# Patient Record
Sex: FEMALE | Race: WHITE | NOT HISPANIC OR LATINO | Employment: FULL TIME | URBAN - METROPOLITAN AREA
[De-identification: names, ages, dates, MRNs, and addresses within clinical notes are randomized per-mention and may not be internally consistent; named-entity substitution may affect disease eponyms.]

---

## 2017-05-04 LAB
BILIRUB SERPL-MCNC: NEGATIVE MG/DL
BLOOD URINE, POC: NEGATIVE
CLARITY, POC UA: CLEAR
COLOR, POC UA: COLORLESS
GLUCOSE UR QL STRIP: NEGATIVE
INFLUENZA A ANTIGEN, POC: NEGATIVE
INFLUENZA A ANTIGEN, POC: POSITIVE
INFLUENZA B ANTIGEN, POC: NEGATIVE
INFLUENZA B ANTIGEN, POC: POSITIVE
KETONES UR QL STRIP: NEGATIVE
LEUKOCYTE EST, POC UA: NEGATIVE
NITRITE, POC UA: NEGATIVE
PH, POC UA: 5
POC BETA-HCG (QUAL): POSITIVE
PROTEIN, POC: NEGATIVE
RAPID GROUP A STREP (OHS): POSITIVE
SPECIFIC GRAVITY, POC UA: 1
UROBILINOGEN, POC UA: NORMAL

## 2017-05-10 ENCOUNTER — HISTORICAL (OUTPATIENT)
Dept: URGENT CARE | Facility: CLINIC | Age: 27
End: 2017-05-10

## 2017-05-10 LAB — TSH SERPL-ACNC: 1.11 MIU/ML (ref 0.36–3.74)

## 2018-02-09 LAB
INFLUENZA A ANTIGEN, POC: POSITIVE
INFLUENZA B ANTIGEN, POC: NEGATIVE

## 2018-07-12 LAB
INFLUENZA A ANTIGEN, POC: NEGATIVE
INFLUENZA B ANTIGEN, POC: NEGATIVE
RAPID GROUP A STREP (OHS): NEGATIVE

## 2018-07-18 LAB — RAPID GROUP A STREP (OHS): NEGATIVE

## 2019-02-07 LAB
INFLUENZA A ANTIGEN, POC: NEGATIVE
INFLUENZA B ANTIGEN, POC: NEGATIVE

## 2019-02-18 ENCOUNTER — HISTORICAL (OUTPATIENT)
Dept: URGENT CARE | Facility: CLINIC | Age: 29
End: 2019-02-18

## 2019-02-18 LAB
ABS NEUT (OLG): 1.71 X10(3)/MCL (ref 2.1–9.2)
EOSINOPHIL NFR BLD MANUAL: 1 % (ref 0–8)
ERYTHROCYTE [DISTWIDTH] IN BLOOD BY AUTOMATED COUNT: 12.4 % (ref 11.5–17)
HCT VFR BLD AUTO: 41 % (ref 37–47)
HGB BLD-MCNC: 13 GM/DL (ref 12–16)
LYMPHOCYTES NFR BLD MANUAL: 50 % (ref 13–40)
MCH RBC QN AUTO: 27.8 PG (ref 27–31)
MCHC RBC AUTO-ENTMCNC: 31.7 GM/DL (ref 33–36)
MCV RBC AUTO: 87.8 FL (ref 80–94)
MONOCYTES NFR BLD MANUAL: 3 % (ref 2–11)
NEUTROPHILS NFR BLD MANUAL: 45 % (ref 47–80)
PLATELET # BLD AUTO: 256 X10(3)/MCL (ref 130–400)
PLATELET # BLD EST: NORMAL 10*3/UL
PMV BLD AUTO: 10.3 FL (ref 7.4–10.4)
RBC # BLD AUTO: 4.67 X10(6)/MCL (ref 4.2–5.4)
WBC # SPEC AUTO: 3.9 X10(3)/MCL (ref 4.5–11.5)

## 2019-04-01 LAB
BILIRUB SERPL-MCNC: NEGATIVE MG/DL
BLOOD URINE, POC: NEGATIVE
CLARITY, POC UA: CLEAR
COLOR, POC UA: NORMAL
GLUCOSE UR QL STRIP: NEGATIVE
KETONES UR QL STRIP: NEGATIVE
LEUKOCYTE EST, POC UA: NEGATIVE
NITRITE, POC UA: NEGATIVE
PH, POC UA: 6
PROTEIN, POC: NEGATIVE
RAPID GROUP A STREP (OHS): NEGATIVE
SPECIFIC GRAVITY, POC UA: 1.02
UROBILINOGEN, POC UA: NORMAL

## 2019-04-05 ENCOUNTER — HISTORICAL (OUTPATIENT)
Dept: LAB | Facility: HOSPITAL | Age: 29
End: 2019-04-05

## 2019-04-05 LAB
ABS NEUT (OLG): 3.35 X10(3)/MCL (ref 2.1–9.2)
ALBUMIN SERPL-MCNC: 3.9 GM/DL (ref 3.4–5)
ALBUMIN/GLOB SERPL: 1.2 {RATIO}
ALP SERPL-CCNC: 39 UNIT/L (ref 38–126)
ALT SERPL-CCNC: 30 UNIT/L (ref 12–78)
ANISOCYTOSIS BLD QL SMEAR: 1
APPEARANCE, UA: CLEAR
AST SERPL-CCNC: 14 UNIT/L (ref 15–37)
BACTERIA SPEC CULT: NORMAL /HPF
BASOPHILS NFR BLD MANUAL: 1 % (ref 0–2)
BILIRUB SERPL-MCNC: 1 MG/DL (ref 0.2–1)
BILIRUB UR QL STRIP: NEGATIVE
BILIRUBIN DIRECT+TOT PNL SERPL-MCNC: 0.2 MG/DL (ref 0–0.2)
BILIRUBIN DIRECT+TOT PNL SERPL-MCNC: 0.8 MG/DL (ref 0–0.8)
BUN SERPL-MCNC: 9 MG/DL (ref 7–18)
CALCIUM SERPL-MCNC: 8.7 MG/DL (ref 8.5–10.1)
CHLORIDE SERPL-SCNC: 105 MMOL/L (ref 98–107)
CHOLEST SERPL-MCNC: 154 MG/DL (ref 0–200)
CHOLEST/HDLC SERPL: 2.3 {RATIO} (ref 0–4)
CO2 SERPL-SCNC: 25 MMOL/L (ref 21–32)
COLOR UR: YELLOW
CREAT SERPL-MCNC: 0.84 MG/DL (ref 0.55–1.02)
DEPRECATED CALCIDIOL+CALCIFEROL SERPL-MC: 15.03 NG/ML (ref 30–80)
EOSINOPHIL NFR BLD MANUAL: 1 % (ref 0–8)
ERYTHROCYTE [DISTWIDTH] IN BLOOD BY AUTOMATED COUNT: 12.2 % (ref 11.5–17)
GLOBULIN SER-MCNC: 3.3 GM/DL (ref 2.4–3.5)
GLUCOSE (UA): NEGATIVE
GLUCOSE SERPL-MCNC: 84 MG/DL (ref 74–106)
HCT VFR BLD AUTO: 40.4 % (ref 37–47)
HDLC SERPL-MCNC: 66 MG/DL (ref 35–60)
HGB BLD-MCNC: 12.9 GM/DL (ref 12–16)
HGB UR QL STRIP: NEGATIVE
KETONES UR QL STRIP: NEGATIVE
LDLC SERPL CALC-MCNC: 72 MG/DL (ref 0–129)
LEUKOCYTE ESTERASE UR QL STRIP: NEGATIVE
LYMPHOCYTES NFR BLD MANUAL: 32 % (ref 13–40)
MCH RBC QN AUTO: 28.2 PG (ref 27–31)
MCHC RBC AUTO-ENTMCNC: 31.9 GM/DL (ref 33–36)
MCV RBC AUTO: 88.4 FL (ref 80–94)
MICROCYTES BLD QL SMEAR: 1
MONOCYTES NFR BLD MANUAL: 8 % (ref 2–11)
NEUTROPHILS NFR BLD MANUAL: 56 % (ref 47–80)
NITRITE UR QL STRIP: NEGATIVE
PH UR STRIP: 6 [PH] (ref 5–9)
PLATELET # BLD AUTO: 299 X10(3)/MCL (ref 130–400)
PLATELET # BLD EST: NORMAL 10*3/UL
PMV BLD AUTO: 11 FL (ref 7.4–10.4)
POTASSIUM SERPL-SCNC: 4 MMOL/L (ref 3.5–5.1)
PROT SERPL-MCNC: 7.2 GM/DL (ref 6.4–8.2)
PROT UR QL STRIP: NEGATIVE
RBC # BLD AUTO: 4.57 X10(6)/MCL (ref 4.2–5.4)
RBC #/AREA URNS HPF: NORMAL /[HPF]
SODIUM SERPL-SCNC: 140 MMOL/L (ref 136–145)
SP GR UR STRIP: 1.01 (ref 1–1.03)
SQUAMOUS EPITHELIAL, UA: NORMAL
T4 FREE SERPL-MCNC: 1.29 NG/DL (ref 0.76–1.46)
TRIGL SERPL-MCNC: 80 MG/DL (ref 30–150)
TSH SERPL-ACNC: 1.67 MIU/L (ref 0.36–3.74)
UROBILINOGEN UR STRIP-ACNC: 0.2
VLDLC SERPL CALC-MCNC: 16 MG/DL
WBC # SPEC AUTO: 6.5 X10(3)/MCL (ref 4.5–11.5)
WBC #/AREA URNS HPF: NORMAL /[HPF]

## 2019-04-17 ENCOUNTER — HISTORICAL (OUTPATIENT)
Dept: ADMINISTRATIVE | Facility: HOSPITAL | Age: 29
End: 2019-04-17

## 2019-09-19 ENCOUNTER — HISTORICAL (OUTPATIENT)
Dept: RADIOLOGY | Facility: HOSPITAL | Age: 29
End: 2019-09-19

## 2020-05-07 ENCOUNTER — HISTORICAL (OUTPATIENT)
Dept: LAB | Facility: HOSPITAL | Age: 30
End: 2020-05-07

## 2020-07-22 ENCOUNTER — HISTORICAL (OUTPATIENT)
Dept: LAB | Facility: HOSPITAL | Age: 30
End: 2020-07-22

## 2020-07-22 LAB — SARS-COV-2 RNA RESP QL NAA+PROBE: DETECTED

## 2020-10-16 ENCOUNTER — HISTORICAL (OUTPATIENT)
Dept: ADMINISTRATIVE | Facility: HOSPITAL | Age: 30
End: 2020-10-16

## 2020-10-16 LAB
ABS NEUT (OLG): 3.1 X10(3)/MCL (ref 2.1–9.2)
ALBUMIN SERPL-MCNC: 4.2 GM/DL (ref 3.5–5)
ALBUMIN/GLOB SERPL: 1.6 RATIO (ref 1.1–2)
ALP SERPL-CCNC: 40 UNIT/L (ref 40–150)
ALT SERPL-CCNC: 8 UNIT/L (ref 0–55)
APPEARANCE, UA: CLEAR
AST SERPL-CCNC: 10 UNIT/L (ref 5–34)
BACTERIA SPEC CULT: NORMAL /HPF
BASOPHILS # BLD AUTO: 0 X10(3)/MCL (ref 0–0.2)
BASOPHILS NFR BLD AUTO: 1 %
BILIRUB SERPL-MCNC: 2.1 MG/DL
BILIRUB UR QL STRIP: NEGATIVE
BILIRUBIN DIRECT+TOT PNL SERPL-MCNC: 0.7 MG/DL (ref 0–0.5)
BILIRUBIN DIRECT+TOT PNL SERPL-MCNC: 1.4 MG/DL (ref 0–0.8)
BUN SERPL-MCNC: 9.3 MG/DL (ref 7–18.7)
CALCIUM SERPL-MCNC: 8.7 MG/DL (ref 8.4–10.2)
CHLORIDE SERPL-SCNC: 106 MMOL/L (ref 98–107)
CHOLEST SERPL-MCNC: 169 MG/DL
CHOLEST/HDLC SERPL: 2 {RATIO} (ref 0–5)
CO2 SERPL-SCNC: 24 MMOL/L (ref 22–29)
COLOR UR: YELLOW
CREAT SERPL-MCNC: 0.79 MG/DL (ref 0.55–1.02)
EOSINOPHIL # BLD AUTO: 0.1 X10(3)/MCL (ref 0–0.9)
EOSINOPHIL NFR BLD AUTO: 2 %
ERYTHROCYTE [DISTWIDTH] IN BLOOD BY AUTOMATED COUNT: 12.6 % (ref 11.5–17)
EST. AVERAGE GLUCOSE BLD GHB EST-MCNC: 85.3 MG/DL
GLOBULIN SER-MCNC: 2.7 GM/DL (ref 2.4–3.5)
GLUCOSE (UA): NEGATIVE
GLUCOSE SERPL-MCNC: 74 MG/DL (ref 74–100)
HBA1C MFR BLD: 4.6 %
HCT VFR BLD AUTO: 40.2 % (ref 37–47)
HCV AB SERPL QL IA: NONREACTIVE
HDLC SERPL-MCNC: 72 MG/DL (ref 35–60)
HGB BLD-MCNC: 12.9 GM/DL (ref 12–16)
HGB UR QL STRIP: NEGATIVE
IMM GRANULOCYTES # BLD AUTO: 0 10*3/UL
IMM GRANULOCYTES NFR BLD AUTO: 0 %
KETONES UR QL STRIP: NEGATIVE
LDLC SERPL CALC-MCNC: 85 MG/DL (ref 50–140)
LEUKOCYTE ESTERASE UR QL STRIP: NEGATIVE
LYMPHOCYTES # BLD AUTO: 1.2 X10(3)/MCL (ref 0.6–4.6)
LYMPHOCYTES NFR BLD AUTO: 25 %
MCH RBC QN AUTO: 28.9 PG (ref 27–31)
MCHC RBC AUTO-ENTMCNC: 32.1 GM/DL (ref 33–36)
MCV RBC AUTO: 90.1 FL (ref 80–94)
MONOCYTES # BLD AUTO: 0.4 X10(3)/MCL (ref 0.1–1.3)
MONOCYTES NFR BLD AUTO: 8 %
NEUTROPHILS # BLD AUTO: 3.1 X10(3)/MCL (ref 2.1–9.2)
NEUTROPHILS NFR BLD AUTO: 64 %
NITRITE UR QL STRIP: NEGATIVE
PH UR STRIP: 5.5 [PH] (ref 5–9)
PLATELET # BLD AUTO: 295 X10(3)/MCL (ref 130–400)
PMV BLD AUTO: 10.4 FL (ref 9.4–12.4)
POTASSIUM SERPL-SCNC: 4.1 MMOL/L (ref 3.5–5.1)
PROT SERPL-MCNC: 6.9 GM/DL (ref 6.4–8.3)
PROT UR QL STRIP: NEGATIVE
RBC # BLD AUTO: 4.46 X10(6)/MCL (ref 4.2–5.4)
RBC #/AREA URNS HPF: NORMAL /[HPF]
SODIUM SERPL-SCNC: 141 MMOL/L (ref 136–145)
SP GR UR STRIP: 1.02 (ref 1–1.03)
SQUAMOUS EPITHELIAL, UA: NORMAL
TRIGL SERPL-MCNC: 62 MG/DL (ref 37–140)
TSH SERPL-ACNC: 1.08 UIU/ML (ref 0.35–4.94)
UROBILINOGEN UR STRIP-ACNC: 0.2
VLDLC SERPL CALC-MCNC: 12 MG/DL
WBC # SPEC AUTO: 4.9 X10(3)/MCL (ref 4.5–11.5)
WBC #/AREA URNS HPF: NORMAL /[HPF]

## 2020-11-02 ENCOUNTER — HISTORICAL (OUTPATIENT)
Dept: RADIOLOGY | Facility: HOSPITAL | Age: 30
End: 2020-11-02

## 2021-02-01 ENCOUNTER — HISTORICAL (OUTPATIENT)
Dept: LAB | Facility: HOSPITAL | Age: 31
End: 2021-02-01

## 2021-02-01 LAB — SARS-COV-2 RNA RESP QL NAA+PROBE: NOT DETECTED

## 2021-11-01 ENCOUNTER — HISTORICAL (OUTPATIENT)
Dept: ADMINISTRATIVE | Facility: HOSPITAL | Age: 31
End: 2021-11-01

## 2021-11-01 LAB
ABS NEUT (OLG): 1.75 X10(3)/MCL (ref 2.1–9.2)
ALBUMIN SERPL-MCNC: 4.4 GM/DL (ref 3.5–5)
ALBUMIN/GLOB SERPL: 1.6 RATIO (ref 1.1–2)
ALP SERPL-CCNC: 43 UNIT/L (ref 40–150)
ALT SERPL-CCNC: 7 UNIT/L (ref 0–55)
APPEARANCE, UA: ABNORMAL
AST SERPL-CCNC: 13 UNIT/L (ref 5–34)
BACTERIA SPEC CULT: ABNORMAL /HPF
BASOPHILS # BLD AUTO: 0.1 X10(3)/MCL (ref 0–0.2)
BASOPHILS NFR BLD AUTO: 2 %
BILIRUB SERPL-MCNC: 1.4 MG/DL
BILIRUB UR QL STRIP: NEGATIVE
BILIRUBIN DIRECT+TOT PNL SERPL-MCNC: 0.5 MG/DL (ref 0–0.5)
BILIRUBIN DIRECT+TOT PNL SERPL-MCNC: 0.9 MG/DL (ref 0–0.8)
BUN SERPL-MCNC: 9.9 MG/DL (ref 7–18.7)
CALCIUM SERPL-MCNC: 9.9 MG/DL (ref 8.7–10.5)
CHLORIDE SERPL-SCNC: 105 MMOL/L (ref 98–107)
CHOLEST SERPL-MCNC: 164 MG/DL
CHOLEST/HDLC SERPL: 2 {RATIO} (ref 0–5)
CO2 SERPL-SCNC: 26 MMOL/L (ref 22–29)
COLOR UR: YELLOW
CREAT SERPL-MCNC: 0.82 MG/DL (ref 0.55–1.02)
DEPRECATED CALCIDIOL+CALCIFEROL SERPL-MC: 45.3 NG/ML (ref 30–80)
EOSINOPHIL # BLD AUTO: 0.1 X10(3)/MCL (ref 0–0.9)
EOSINOPHIL NFR BLD AUTO: 3 %
ERYTHROCYTE [DISTWIDTH] IN BLOOD BY AUTOMATED COUNT: 13 % (ref 11.5–17)
EST. AVERAGE GLUCOSE BLD GHB EST-MCNC: 96.8 MG/DL
GLOBULIN SER-MCNC: 2.7 GM/DL (ref 2.4–3.5)
GLUCOSE (UA): NEGATIVE
GLUCOSE SERPL-MCNC: 82 MG/DL (ref 74–100)
HBA1C MFR BLD: 5 %
HCT VFR BLD AUTO: 38.7 % (ref 37–47)
HDLC SERPL-MCNC: 74 MG/DL (ref 35–60)
HGB BLD-MCNC: 12 GM/DL (ref 12–16)
HGB UR QL STRIP: ABNORMAL
KETONES UR QL STRIP: ABNORMAL
LDLC SERPL CALC-MCNC: 81 MG/DL (ref 50–140)
LEUKOCYTE ESTERASE UR QL STRIP: ABNORMAL
LYMPHOCYTES # BLD AUTO: 1.4 X10(3)/MCL (ref 0.6–4.6)
LYMPHOCYTES NFR BLD AUTO: 38 %
MCH RBC QN AUTO: 28 PG (ref 27–31)
MCHC RBC AUTO-ENTMCNC: 31 GM/DL (ref 33–36)
MCV RBC AUTO: 90.2 FL (ref 80–94)
MONOCYTES # BLD AUTO: 0.3 X10(3)/MCL (ref 0.1–1.3)
MONOCYTES NFR BLD AUTO: 8 %
NEUTROPHILS # BLD AUTO: 1.75 X10(3)/MCL (ref 2.1–9.2)
NEUTROPHILS NFR BLD AUTO: 48 %
NITRITE UR QL STRIP: NEGATIVE
PH UR STRIP: 5.5 [PH] (ref 5–9)
PLATELET # BLD AUTO: 365 X10(3)/MCL (ref 130–400)
PMV BLD AUTO: 10.2 FL (ref 9.4–12.4)
POTASSIUM SERPL-SCNC: 4.2 MMOL/L (ref 3.5–5.1)
PROT SERPL-MCNC: 7.1 GM/DL (ref 6.4–8.3)
PROT UR QL STRIP: ABNORMAL
RBC # BLD AUTO: 4.29 X10(6)/MCL (ref 4.2–5.4)
RBC #/AREA URNS HPF: ABNORMAL /[HPF]
SODIUM SERPL-SCNC: 140 MMOL/L (ref 136–145)
SP GR UR STRIP: 1.02 (ref 1–1.03)
SQUAMOUS EPITHELIAL, UA: 18 /HPF (ref 0–4)
TRIGL SERPL-MCNC: 45 MG/DL (ref 37–140)
TSH SERPL-ACNC: 1.58 UIU/ML (ref 0.35–4.94)
UROBILINOGEN UR STRIP-ACNC: 0.2
VLDLC SERPL CALC-MCNC: 9 MG/DL
WBC # SPEC AUTO: 3.7 X10(3)/MCL (ref 4.5–11.5)
WBC #/AREA URNS HPF: 287 /HPF (ref 0–3)

## 2021-11-02 LAB — FINAL CULTURE: NORMAL

## 2022-04-07 ENCOUNTER — HISTORICAL (OUTPATIENT)
Dept: ADMINISTRATIVE | Facility: HOSPITAL | Age: 32
End: 2022-04-07

## 2022-04-24 VITALS
DIASTOLIC BLOOD PRESSURE: 60 MMHG | OXYGEN SATURATION: 98 % | WEIGHT: 118.5 LBS | HEIGHT: 63 IN | BODY MASS INDEX: 21 KG/M2 | SYSTOLIC BLOOD PRESSURE: 90 MMHG

## 2022-06-01 ENCOUNTER — HOSPITAL ENCOUNTER (OUTPATIENT)
Dept: RADIOLOGY | Facility: HOSPITAL | Age: 32
Discharge: HOME | End: 2022-06-01
Attending: NURSE PRACTITIONER
Payer: COMMERCIAL

## 2022-06-01 DIAGNOSIS — Z12.31 ENCOUNTER FOR SCREENING MAMMOGRAM FOR MALIGNANT NEOPLASM OF BREAST: ICD-10-CM

## 2022-06-01 PROCEDURE — 77067 SCR MAMMO BI INCL CAD: CPT | Mod: 26,,, | Performed by: RADIOLOGY

## 2022-06-01 PROCEDURE — 77063 MAMMO DIGITAL SCREENING BILAT WITH TOMO: ICD-10-PCS | Mod: 26,,, | Performed by: RADIOLOGY

## 2022-06-01 PROCEDURE — 77063 BREAST TOMOSYNTHESIS BI: CPT | Mod: 26,,, | Performed by: RADIOLOGY

## 2022-06-01 PROCEDURE — 77067 SCR MAMMO BI INCL CAD: CPT | Mod: TC

## 2022-06-01 PROCEDURE — 77067 MAMMO DIGITAL SCREENING BILAT WITH TOMO: ICD-10-PCS | Mod: 26,,, | Performed by: RADIOLOGY

## 2022-09-15 ENCOUNTER — HISTORICAL (OUTPATIENT)
Dept: ADMINISTRATIVE | Facility: HOSPITAL | Age: 32
End: 2022-09-15
Payer: COMMERCIAL

## 2022-09-21 ENCOUNTER — HISTORICAL (OUTPATIENT)
Dept: ADMINISTRATIVE | Facility: HOSPITAL | Age: 32
End: 2022-09-21
Payer: COMMERCIAL

## 2022-09-22 ENCOUNTER — HISTORICAL (OUTPATIENT)
Dept: ADMINISTRATIVE | Facility: HOSPITAL | Age: 32
End: 2022-09-22
Payer: COMMERCIAL

## 2022-10-12 ENCOUNTER — OFFICE VISIT (OUTPATIENT)
Dept: URGENT CARE | Facility: CLINIC | Age: 32
End: 2022-10-12
Payer: COMMERCIAL

## 2022-10-12 VITALS
BODY MASS INDEX: 21.71 KG/M2 | TEMPERATURE: 98 F | HEIGHT: 62 IN | HEART RATE: 69 BPM | SYSTOLIC BLOOD PRESSURE: 109 MMHG | OXYGEN SATURATION: 100 % | WEIGHT: 118 LBS | DIASTOLIC BLOOD PRESSURE: 75 MMHG

## 2022-10-12 DIAGNOSIS — R42 VERTIGO: Primary | ICD-10-CM

## 2022-10-12 DIAGNOSIS — H69.90 DYSFUNCTION OF EUSTACHIAN TUBE, UNSPECIFIED LATERALITY: ICD-10-CM

## 2022-10-12 DIAGNOSIS — H65.193 ACUTE MIDDLE EAR EFFUSION, BILATERAL: ICD-10-CM

## 2022-10-12 PROCEDURE — 99203 OFFICE O/P NEW LOW 30 MIN: CPT | Mod: 25,,, | Performed by: FAMILY MEDICINE

## 2022-10-12 PROCEDURE — 99203 PR OFFICE/OUTPT VISIT, NEW, LEVL III, 30-44 MIN: ICD-10-PCS | Mod: 25,,, | Performed by: FAMILY MEDICINE

## 2022-10-12 PROCEDURE — 96372 PR INJECTION,THERAP/PROPH/DIAG2ST, IM OR SUBCUT: ICD-10-PCS | Mod: ,,, | Performed by: FAMILY MEDICINE

## 2022-10-12 PROCEDURE — 1160F PR REVIEW ALL MEDS BY PRESCRIBER/CLIN PHARMACIST DOCUMENTED: ICD-10-PCS | Mod: CPTII,,, | Performed by: FAMILY MEDICINE

## 2022-10-12 PROCEDURE — 3008F PR BODY MASS INDEX (BMI) DOCUMENTED: ICD-10-PCS | Mod: CPTII,,, | Performed by: FAMILY MEDICINE

## 2022-10-12 PROCEDURE — 1159F MED LIST DOCD IN RCRD: CPT | Mod: CPTII,,, | Performed by: FAMILY MEDICINE

## 2022-10-12 PROCEDURE — 1159F PR MEDICATION LIST DOCUMENTED IN MEDICAL RECORD: ICD-10-PCS | Mod: CPTII,,, | Performed by: FAMILY MEDICINE

## 2022-10-12 PROCEDURE — 3074F SYST BP LT 130 MM HG: CPT | Mod: CPTII,,, | Performed by: FAMILY MEDICINE

## 2022-10-12 PROCEDURE — 3074F PR MOST RECENT SYSTOLIC BLOOD PRESSURE < 130 MM HG: ICD-10-PCS | Mod: CPTII,,, | Performed by: FAMILY MEDICINE

## 2022-10-12 PROCEDURE — 3078F PR MOST RECENT DIASTOLIC BLOOD PRESSURE < 80 MM HG: ICD-10-PCS | Mod: CPTII,,, | Performed by: FAMILY MEDICINE

## 2022-10-12 PROCEDURE — 3008F BODY MASS INDEX DOCD: CPT | Mod: CPTII,,, | Performed by: FAMILY MEDICINE

## 2022-10-12 PROCEDURE — 1160F RVW MEDS BY RX/DR IN RCRD: CPT | Mod: CPTII,,, | Performed by: FAMILY MEDICINE

## 2022-10-12 PROCEDURE — 3078F DIAST BP <80 MM HG: CPT | Mod: CPTII,,, | Performed by: FAMILY MEDICINE

## 2022-10-12 PROCEDURE — 96372 THER/PROPH/DIAG INJ SC/IM: CPT | Mod: ,,, | Performed by: FAMILY MEDICINE

## 2022-10-12 RX ORDER — COPPER 313.4 MG/1
INTRAUTERINE DEVICE INTRAUTERINE
COMMUNITY
Start: 2021-07-21 | End: 2023-11-06

## 2022-10-12 RX ORDER — DEXAMETHASONE SODIUM PHOSPHATE 100 MG/10ML
8 INJECTION INTRAMUSCULAR; INTRAVENOUS
Status: COMPLETED | OUTPATIENT
Start: 2022-10-12 | End: 2022-10-12

## 2022-10-12 RX ORDER — PREDNISONE 10 MG/1
30 TABLET ORAL DAILY
Qty: 15 TABLET | Refills: 0 | Status: SHIPPED | OUTPATIENT
Start: 2022-10-12 | End: 2022-10-17

## 2022-10-12 RX ADMIN — DEXAMETHASONE SODIUM PHOSPHATE 8 MG: 100 INJECTION INTRAMUSCULAR; INTRAVENOUS at 12:10

## 2022-10-12 NOTE — PATIENT INSTRUCTIONS
Steroids sent to pharmacy  Start the steroids in the morning  Flonase or Nasacort daily  Continue Zyrtec D daily  This combination should clear up the fluid in the ears if not notify us and we will refer you to ENT

## 2022-10-12 NOTE — PROGRESS NOTES
"Subjective:       Patient ID: Nikki Rodriguez is a 32 y.o. female.    Vitals:  height is 5' 2" (1.575 m) and weight is 53.5 kg (118 lb). Her temperature is 98.2 °F (36.8 °C). Her blood pressure is 109/75 and her pulse is 69. Her oxygen saturation is 100%.     Chief Complaint: Dizziness    32 y.o female presents to clinic with dizziness, nausea, and fluid in ears for 5 days.  Denies any fever.  Denies drainage of the ears.  History of vertigo.        Constitution: Negative.   HENT: Negative.     Cardiovascular: Negative.    Eyes: Negative.    Respiratory: Negative.     Gastrointestinal: Negative.    Genitourinary: Negative.    Musculoskeletal: Negative.    Skin: Negative.    Allergic/Immunologic: Negative.    Neurological:  Positive for dizziness.   Hematologic/Lymphatic: Negative.      Objective:      Physical Exam   Constitutional: She is oriented to person, place, and time.  Non-toxic appearance. She does not appear ill. No distress. normal  HENT:   Head: Normocephalic and atraumatic.   Ears:   Right Ear: impacted cerumen  Left Ear: impacted cerumen (small effusion bilateral TMs)  Mouth/Throat: No posterior oropharyngeal erythema (postnasal drip is present).   Eyes: Conjunctivae are normal.   Pulmonary/Chest: Effort normal.   Abdominal: Normal appearance.   Neurological: She is alert and oriented to person, place, and time.   Skin: Skin is not diaphoretic.   Psychiatric: Her behavior is normal. Mood, judgment and thought content normal.   Vitals reviewed.         Previous History      Review of patient's allergies indicates:   Allergen Reactions    Azithromycin Itching, Rash and Hives       Past Medical History:   Diagnosis Date    Anxiety     Bipolar disorder     Depression      Current Outpatient Medications   Medication Instructions    copper intrauterine device (PARAGARD T 380A) 380 square mm IUD ParaGard intrauterine device  Start Date: 7/21/21  Status: Ordered    predniSONE (DELTASONE) 30 mg, Oral, Daily " "    History reviewed. No pertinent surgical history.  Family History   Problem Relation Age of Onset    No Known Problems Mother     No Known Problems Father        Social History     Tobacco Use    Smoking status: Never    Smokeless tobacco: Never   Substance Use Topics    Alcohol use: Yes     Comment: one time a week    Drug use: Never        Physical Exam      Vital Signs Reviewed   /75   Pulse 69   Temp 98.2 °F (36.8 °C)   Ht 5' 2" (1.575 m)   Wt 53.5 kg (118 lb)   LMP 10/06/2022 (Approximate)   SpO2 100%   BMI 21.58 kg/m²        Procedures    Procedures     Labs     Results for orders placed or performed in visit on 09/22/22   POCT Influenza A/B Molecular   Result Value Ref Range    Inflenza A Ag Positive     Influenza B Ag Negative        Assessment:       1. Vertigo    2. Dysfunction of Eustachian tube, unspecified laterality    3. Acute middle ear effusion, bilateral          Plan:       Steroids sent to pharmacy  Start the steroids in the morning  Flonase or Nasacort daily  Continue Zyrtec D daily  This combination should clear up the fluid in the ears if not notify us and we will refer you to ENT  Vertigo    Dysfunction of Eustachian tube, unspecified laterality    Acute middle ear effusion, bilateral    Other orders  -     dexAMETHasone injection 8 mg  -     predniSONE (DELTASONE) 10 MG tablet; Take 3 tablets (30 mg total) by mouth once daily. for 5 days  Dispense: 15 tablet; Refill: 0                   "

## 2022-10-17 ENCOUNTER — TELEPHONE (OUTPATIENT)
Dept: URGENT CARE | Facility: CLINIC | Age: 32
End: 2022-10-17
Payer: COMMERCIAL

## 2022-10-17 DIAGNOSIS — R42 VERTIGO: Primary | ICD-10-CM

## 2022-11-02 ENCOUNTER — OFFICE VISIT (OUTPATIENT)
Dept: FAMILY MEDICINE | Facility: CLINIC | Age: 32
End: 2022-11-02
Payer: COMMERCIAL

## 2022-11-02 ENCOUNTER — TELEPHONE (OUTPATIENT)
Dept: FAMILY MEDICINE | Facility: CLINIC | Age: 32
End: 2022-11-02

## 2022-11-02 VITALS
HEIGHT: 62 IN | HEART RATE: 64 BPM | TEMPERATURE: 98 F | BODY MASS INDEX: 23.61 KG/M2 | OXYGEN SATURATION: 98 % | WEIGHT: 128.31 LBS | RESPIRATION RATE: 17 BRPM | SYSTOLIC BLOOD PRESSURE: 99 MMHG | DIASTOLIC BLOOD PRESSURE: 66 MMHG

## 2022-11-02 DIAGNOSIS — F31.9 BIPOLAR AFFECTIVE DISORDER, REMISSION STATUS UNSPECIFIED: Chronic | ICD-10-CM

## 2022-11-02 DIAGNOSIS — F90.1 ATTENTION DEFICIT HYPERACTIVITY DISORDER (ADHD), PREDOMINANTLY HYPERACTIVE TYPE: Chronic | ICD-10-CM

## 2022-11-02 DIAGNOSIS — Z13.31 POSITIVE DEPRESSION SCREENING: ICD-10-CM

## 2022-11-02 DIAGNOSIS — F41.9 ANXIETY: Chronic | ICD-10-CM

## 2022-11-02 DIAGNOSIS — Z00.00 WELLNESS EXAMINATION: Primary | ICD-10-CM

## 2022-11-02 DIAGNOSIS — F32.9 MAJOR DEPRESSIVE DISORDER, REMISSION STATUS UNSPECIFIED, UNSPECIFIED WHETHER RECURRENT: Chronic | ICD-10-CM

## 2022-11-02 PROBLEM — M41.9 SCOLIOSIS: Chronic | Status: ACTIVE | Noted: 2022-11-02

## 2022-11-02 PROBLEM — M41.9 SCOLIOSIS: Status: ACTIVE | Noted: 2022-11-02

## 2022-11-02 PROCEDURE — 3078F PR MOST RECENT DIASTOLIC BLOOD PRESSURE < 80 MM HG: ICD-10-PCS | Mod: CPTII,,, | Performed by: STUDENT IN AN ORGANIZED HEALTH CARE EDUCATION/TRAINING PROGRAM

## 2022-11-02 PROCEDURE — 1159F PR MEDICATION LIST DOCUMENTED IN MEDICAL RECORD: ICD-10-PCS | Mod: CPTII,,, | Performed by: STUDENT IN AN ORGANIZED HEALTH CARE EDUCATION/TRAINING PROGRAM

## 2022-11-02 PROCEDURE — 1160F RVW MEDS BY RX/DR IN RCRD: CPT | Mod: CPTII,,, | Performed by: STUDENT IN AN ORGANIZED HEALTH CARE EDUCATION/TRAINING PROGRAM

## 2022-11-02 PROCEDURE — 3074F PR MOST RECENT SYSTOLIC BLOOD PRESSURE < 130 MM HG: ICD-10-PCS | Mod: CPTII,,, | Performed by: STUDENT IN AN ORGANIZED HEALTH CARE EDUCATION/TRAINING PROGRAM

## 2022-11-02 PROCEDURE — 3008F BODY MASS INDEX DOCD: CPT | Mod: CPTII,,, | Performed by: STUDENT IN AN ORGANIZED HEALTH CARE EDUCATION/TRAINING PROGRAM

## 2022-11-02 PROCEDURE — 1160F PR REVIEW ALL MEDS BY PRESCRIBER/CLIN PHARMACIST DOCUMENTED: ICD-10-PCS | Mod: CPTII,,, | Performed by: STUDENT IN AN ORGANIZED HEALTH CARE EDUCATION/TRAINING PROGRAM

## 2022-11-02 PROCEDURE — 3078F DIAST BP <80 MM HG: CPT | Mod: CPTII,,, | Performed by: STUDENT IN AN ORGANIZED HEALTH CARE EDUCATION/TRAINING PROGRAM

## 2022-11-02 PROCEDURE — 1159F MED LIST DOCD IN RCRD: CPT | Mod: CPTII,,, | Performed by: STUDENT IN AN ORGANIZED HEALTH CARE EDUCATION/TRAINING PROGRAM

## 2022-11-02 PROCEDURE — 99395 PR PREVENTIVE VISIT,EST,18-39: ICD-10-PCS | Mod: ,,, | Performed by: STUDENT IN AN ORGANIZED HEALTH CARE EDUCATION/TRAINING PROGRAM

## 2022-11-02 PROCEDURE — 99395 PREV VISIT EST AGE 18-39: CPT | Mod: ,,, | Performed by: STUDENT IN AN ORGANIZED HEALTH CARE EDUCATION/TRAINING PROGRAM

## 2022-11-02 PROCEDURE — 3074F SYST BP LT 130 MM HG: CPT | Mod: CPTII,,, | Performed by: STUDENT IN AN ORGANIZED HEALTH CARE EDUCATION/TRAINING PROGRAM

## 2022-11-02 PROCEDURE — 3008F PR BODY MASS INDEX (BMI) DOCUMENTED: ICD-10-PCS | Mod: CPTII,,, | Performed by: STUDENT IN AN ORGANIZED HEALTH CARE EDUCATION/TRAINING PROGRAM

## 2022-11-02 NOTE — PROGRESS NOTES
Patient ID: [id number]     Chief Complaint: Annual Exam        HPI:      Disclaimer:  This note is prepared using voice recognition software and as such is likely to have errors despite attempts at proofreading. Please contact me for questions.    Nikki Rodriguez is a 32 y.o. female here today for an annual wellness visit. Reviewed and discussed lab results. She has not had bloodwork completed prior to visit .  Overall she feels well. No other complaints today.      LMP- Copper IUD. Regular  Chronic Issues-    Anxiety  Attention deficit hyperactivity disorder (ADHD), predominantly   hyperactive type  Bipolar disorder  Depression  Above well controlled with out meds     History reviewed. No pertinent surgical history.    Review of patient's allergies indicates:   Allergen Reactions    Azithromycin Itching, Rash and Hives       Outpatient Medications Marked as Taking for the 11/2/22 encounter (Office Visit) with Kasia Rodríguez MD   Medication Sig Dispense Refill    copper intrauterine device (PARAGARD T 380A) 380 square mm IUD ParaGard intrauterine device  Start Date: 7/21/21  Status: Ordered         Social History     Socioeconomic History    Marital status:    Tobacco Use    Smoking status: Never    Smokeless tobacco: Never   Substance and Sexual Activity    Alcohol use: Yes     Comment: one time a week    Drug use: Never    Sexual activity: Yes        Family History   Problem Relation Age of Onset    No Known Problems Mother     No Known Problems Father         Patient Care Team:  Kasia Rodríguez MD as PCP - General (Family Medicine)       Subjective:     ROS    See HPI for details    Constitutional: Denies Change in appetite. Denies Chills. Denies Fever. Denies Night sweats.  Eye: Denies Blurred vision. Denies Discharge. Denies Eye pain.  ENT: Denies Decreased hearing. Denies Sore throat. Denies Swollen glands.  Respiratory: Denies Cough. Denies Shortness of breath. Denies Shortness of breath with  "exertion. Denies Wheezing.  Cardiovascular: DeniesChest pain at rest. Denies Chest pain with exertion. Denies Irregular heartbeat. Denies Palpitations. Denies Edema.  Gastrointestinal: Denies Abdominal pain. DeniesDiarrhea. Denies Nausea. Denies Vomiting. Denies Hematemesis or Hematochezia.  Genitourinary: Denies Dysuria. Denies Urinary frequency. Denies Urinary urgency. Denies Blood in urine.  Endocrine: Denies Cold intolerance. Denies Excessive thirst. Denies Heat intolerance. Denies Weight loss. Denies Weight gain.  Musculoskeletal: Denies Painful joints. Denies Weakness.  Integumentary: Denies Rash. Denies Itching. Denies Dry skin.  Neurologic: Denies Dizziness. Denies Fainting. Denies Headache.  Psychiatric: Denies Depression. Denies Anxiety. Denies Suicidal/Homicidal ideations.    All Other ROS: Negative except as stated in HPI.       Objective:     BP 99/66 (BP Location: Left arm, Patient Position: Sitting, BP Method: Large (Manual))   Pulse 64   Temp 97.6 °F (36.4 °C) (Oral)   Resp 17   Ht 5' 2" (1.575 m)   Wt 58.2 kg (128 lb 4.8 oz)   LMP 10/05/2022 (Approximate)   SpO2 98%   BMI 23.47 kg/m²     Physical Exam    General: Alert and oriented, No acute distress.  Head: Normocephalic, Atraumatic.  Eye: Pupils are equal, round and reactive to light, Extraocular movements are intact, Sclera non-icteric.  Ears/Nose/Throat: Normal, Mucosa moist,Clear.  Neck/Thyroid: Supple, Non-tender, No carotid bruit, No palpable thyromegaly or thyroid nodule, No lymphadenopathy, No JVD, Full range of motion.  Respiratory: Clear to auscultation bilaterally; No wheezes, rales or rhonchi, Non-labored respirations, Symmetrical chest wall expansion.  Cardiovascular: Regular rate and rhythm, S1/S2 normal, No murmurs, rubs or gallops.  Gastrointestinal: Soft, Non-tender, Non-distended, Normal bowel sounds, No palpable organomegaly.  Musculoskeletal: Normal range of motion.  Integumentary: Warm, Dry, Intact, No suspicious " lesions or rashes.  Extremities: No clubbing, cyanosis or edema  Neurologic: No focal deficits, Cranial Nerves II-XII are grossly intact, Motor strength normal upper and lower extremities, Sensory exam intact.  Psychiatric: Normal interaction, Coherent speech, Euthymic mood, Appropriate affect       Assessment:       ICD-10-CM ICD-9-CM   1. Wellness examination  Z00.00 V70.0   2. Major depressive disorder, remission status unspecified, unspecified whether recurrent  F32.9 296.20   3. Bipolar affective disorder, remission status unspecified  F31.9 296.80   4. Anxiety  F41.9 300.00   5. Attention deficit hyperactivity disorder (ADHD), predominantly hyperactive type  F90.1 314.01   6. Positive depression screening  Z13.31 796.4        Plan:       1. Wellness examination  - CBC Auto Differential; Future  - Comprehensive Metabolic Panel; Future  - Lipid Panel; Future  - TSH; Future  - Hemoglobin A1C; Future  - Urinalysis; Future  - Urinalysis  Health Maintenance Topics with due status: Not Due       Topic Last Completion Date    TETANUS VACCINE 09/06/2016      Cervical Cancer Screening - Last Pap on 2022. Will obtain results   Breast Cancer Screening - Last Mammogram 2022. Wnl  Eye Exam - Recommend annually.  Dental Exam - Recommend biannual exams.   Vaccinations -   Immunization History   Administered Date(s) Administered    COVID-19, MRNA, LN-S, PF (Pfizer) (Purple Cap) 08/02/2021, 08/23/2021    Influenza - Quadrivalent - PF *Preferred* (6 months and older) 10/05/2016, 10/15/2021    Influenza - Trivalent - PF (ADULT) 10/17/2017, 10/15/2020    Tdap 09/06/2016      Will treat pending lab results. Monthly breast self exam encouraged. Diet, exercise encouraged. Keep appointment with GYN for annual pap smear, MMG as scheduled. Notify M.D. or ER if temp greater than 100.4, or any acute illness/      2. Major depressive disorder, remission status unspecified, unspecified whether recurrent  3. Bipolar affective disorder,  remission status unspecified  4. Anxiety  5. Attention deficit hyperactivity disorder (ADHD), predominantly hyperactive type  All above well controlled with out meds  Rec to keep seeing [facility]  ED precaution provided          Medication List with Changes/Refills   Current Medications    COPPER INTRAUTERINE DEVICE (PARAGARD T 380A) 380 SQUARE MM IUD    ParaGard intrauterine device  Start Date: 7/21/21  Status: Ordered       Start Date: 7/21/2021 End Date: --          The patient's Health Maintenance was reviewed and the following appears to be due at this time:   Health Maintenance Due   Topic Date Due    Cervical Cancer Screening  Never done         Follow up in about 6 months (around 5/2/2023) for  for mental health. Marcell[place] 1 year ernie. In addition to their scheduled follow up, the patient has also been instructed to follow up on as needed basis.

## 2022-11-02 NOTE — TELEPHONE ENCOUNTER
----- Message from Kasia Rodríguez MD sent at 11/2/2022  8:31 AM CDT -----  Please obtain Pap Smear results from Dr. Jhon Ng office and Mammogram on 6/1/22

## 2022-11-22 ENCOUNTER — LAB VISIT (OUTPATIENT)
Dept: LAB | Facility: HOSPITAL | Age: 32
End: 2022-11-22
Attending: STUDENT IN AN ORGANIZED HEALTH CARE EDUCATION/TRAINING PROGRAM
Payer: COMMERCIAL

## 2022-11-22 DIAGNOSIS — Z00.00 WELLNESS EXAMINATION: ICD-10-CM

## 2022-11-22 LAB
ALBUMIN SERPL-MCNC: 4.2 GM/DL (ref 3.5–5)
ALBUMIN/GLOB SERPL: 1.6 RATIO (ref 1.1–2)
ALP SERPL-CCNC: 43 UNIT/L (ref 40–150)
ALT SERPL-CCNC: 8 UNIT/L (ref 0–55)
AST SERPL-CCNC: 11 UNIT/L (ref 5–34)
BASOPHILS # BLD AUTO: 0.04 X10(3)/MCL (ref 0–0.2)
BASOPHILS NFR BLD AUTO: 0.9 %
BILIRUBIN DIRECT+TOT PNL SERPL-MCNC: 1.9 MG/DL
BUN SERPL-MCNC: 11.1 MG/DL (ref 7–18.7)
CALCIUM SERPL-MCNC: 9.6 MG/DL (ref 8.4–10.2)
CHLORIDE SERPL-SCNC: 106 MMOL/L (ref 98–107)
CHOLEST SERPL-MCNC: 158 MG/DL
CHOLEST/HDLC SERPL: 3 {RATIO} (ref 0–5)
CO2 SERPL-SCNC: 26 MMOL/L (ref 22–29)
CREAT SERPL-MCNC: 0.81 MG/DL (ref 0.55–1.02)
EOSINOPHIL # BLD AUTO: 0.13 X10(3)/MCL (ref 0–0.9)
EOSINOPHIL NFR BLD AUTO: 2.8 %
ERYTHROCYTE [DISTWIDTH] IN BLOOD BY AUTOMATED COUNT: 12.4 % (ref 11.5–17)
EST. AVERAGE GLUCOSE BLD GHB EST-MCNC: 91.1 MG/DL
GFR SERPLBLD CREATININE-BSD FMLA CKD-EPI: >60 MLS/MIN/1.73/M2
GLOBULIN SER-MCNC: 2.7 GM/DL (ref 2.4–3.5)
GLUCOSE SERPL-MCNC: 79 MG/DL (ref 74–100)
HBA1C MFR BLD: 4.8 %
HCT VFR BLD AUTO: 39.7 % (ref 37–47)
HDLC SERPL-MCNC: 60 MG/DL (ref 35–60)
HGB BLD-MCNC: 12.6 GM/DL (ref 12–16)
IMM GRANULOCYTES # BLD AUTO: 0.01 X10(3)/MCL (ref 0–0.04)
IMM GRANULOCYTES NFR BLD AUTO: 0.2 %
LDLC SERPL CALC-MCNC: 88 MG/DL (ref 50–140)
LYMPHOCYTES # BLD AUTO: 1.46 X10(3)/MCL (ref 0.6–4.6)
LYMPHOCYTES NFR BLD AUTO: 31.9 %
MCH RBC QN AUTO: 28.1 PG (ref 27–31)
MCHC RBC AUTO-ENTMCNC: 31.7 MG/DL (ref 33–36)
MCV RBC AUTO: 88.4 FL (ref 80–94)
MONOCYTES # BLD AUTO: 0.4 X10(3)/MCL (ref 0.1–1.3)
MONOCYTES NFR BLD AUTO: 8.8 %
NEUTROPHILS # BLD AUTO: 2.5 X10(3)/MCL (ref 2.1–9.2)
NEUTROPHILS NFR BLD AUTO: 55.4 %
NRBC BLD AUTO-RTO: 0 %
PLATELET # BLD AUTO: 312 X10(3)/MCL (ref 130–400)
PMV BLD AUTO: 9.9 FL (ref 7.4–10.4)
POTASSIUM SERPL-SCNC: 4 MMOL/L (ref 3.5–5.1)
PROT SERPL-MCNC: 6.9 GM/DL (ref 6.4–8.3)
RBC # BLD AUTO: 4.49 X10(6)/MCL (ref 4.2–5.4)
SODIUM SERPL-SCNC: 140 MMOL/L (ref 136–145)
TRIGL SERPL-MCNC: 49 MG/DL (ref 37–140)
TSH SERPL-ACNC: 1.73 UIU/ML (ref 0.35–4.94)
VLDLC SERPL CALC-MCNC: 10 MG/DL
WBC # SPEC AUTO: 4.6 X10(3)/MCL (ref 4.5–11.5)

## 2022-11-22 PROCEDURE — 36415 COLL VENOUS BLD VENIPUNCTURE: CPT

## 2022-11-22 PROCEDURE — 80053 COMPREHEN METABOLIC PANEL: CPT

## 2022-11-22 PROCEDURE — 83036 HEMOGLOBIN GLYCOSYLATED A1C: CPT

## 2022-11-22 PROCEDURE — 80061 LIPID PANEL: CPT

## 2022-11-22 PROCEDURE — 84443 ASSAY THYROID STIM HORMONE: CPT

## 2022-11-22 PROCEDURE — 85025 COMPLETE CBC W/AUTO DIFF WBC: CPT

## 2023-07-31 ENCOUNTER — OFFICE VISIT (OUTPATIENT)
Dept: FAMILY MEDICINE | Facility: CLINIC | Age: 33
End: 2023-07-31
Payer: COMMERCIAL

## 2023-07-31 VITALS — BODY MASS INDEX: 22.08 KG/M2 | WEIGHT: 120 LBS | HEIGHT: 62 IN

## 2023-07-31 DIAGNOSIS — F31.9 BIPOLAR AFFECTIVE DISORDER, REMISSION STATUS UNSPECIFIED: ICD-10-CM

## 2023-07-31 DIAGNOSIS — A60.04 HERPES SIMPLEX VULVOVAGINITIS: ICD-10-CM

## 2023-07-31 PROCEDURE — 1159F PR MEDICATION LIST DOCUMENTED IN MEDICAL RECORD: ICD-10-PCS | Mod: CPTII,95,, | Performed by: STUDENT IN AN ORGANIZED HEALTH CARE EDUCATION/TRAINING PROGRAM

## 2023-07-31 PROCEDURE — 1160F PR REVIEW ALL MEDS BY PRESCRIBER/CLIN PHARMACIST DOCUMENTED: ICD-10-PCS | Mod: CPTII,95,, | Performed by: STUDENT IN AN ORGANIZED HEALTH CARE EDUCATION/TRAINING PROGRAM

## 2023-07-31 PROCEDURE — 99213 PR OFFICE/OUTPT VISIT, EST, LEVL III, 20-29 MIN: ICD-10-PCS | Mod: 95,,, | Performed by: STUDENT IN AN ORGANIZED HEALTH CARE EDUCATION/TRAINING PROGRAM

## 2023-07-31 PROCEDURE — 1160F RVW MEDS BY RX/DR IN RCRD: CPT | Mod: CPTII,95,, | Performed by: STUDENT IN AN ORGANIZED HEALTH CARE EDUCATION/TRAINING PROGRAM

## 2023-07-31 PROCEDURE — 1159F MED LIST DOCD IN RCRD: CPT | Mod: CPTII,95,, | Performed by: STUDENT IN AN ORGANIZED HEALTH CARE EDUCATION/TRAINING PROGRAM

## 2023-07-31 PROCEDURE — 99213 OFFICE O/P EST LOW 20 MIN: CPT | Mod: 95,,, | Performed by: STUDENT IN AN ORGANIZED HEALTH CARE EDUCATION/TRAINING PROGRAM

## 2023-07-31 PROCEDURE — 3008F PR BODY MASS INDEX (BMI) DOCUMENTED: ICD-10-PCS | Mod: CPTII,95,, | Performed by: STUDENT IN AN ORGANIZED HEALTH CARE EDUCATION/TRAINING PROGRAM

## 2023-07-31 PROCEDURE — 3008F BODY MASS INDEX DOCD: CPT | Mod: CPTII,95,, | Performed by: STUDENT IN AN ORGANIZED HEALTH CARE EDUCATION/TRAINING PROGRAM

## 2023-07-31 RX ORDER — NORETHINDRONE ACETATE/ETHINYL ESTRADIOL AND FERROUS FUMARATE 1MG-20(24)
1 KIT ORAL DAILY
COMMUNITY
Start: 2023-07-26 | End: 2024-02-26 | Stop reason: SDUPTHER

## 2023-07-31 RX ORDER — ACYCLOVIR 800 MG/1
800 TABLET ORAL 2 TIMES DAILY
COMMUNITY
Start: 2023-07-14 | End: 2023-07-31

## 2023-07-31 RX ORDER — VALACYCLOVIR HYDROCHLORIDE 1 G/1
1000 TABLET, FILM COATED ORAL DAILY
Qty: 30 TABLET | Refills: 11 | Status: SHIPPED | OUTPATIENT
Start: 2023-07-31 | End: 2024-07-30

## 2023-07-31 NOTE — PROGRESS NOTES
Patient ID: [id number]     Chief Complaint: Medication Refill (Aciclovir) and Flare ups      HPI:     This is a telemedicine note. Patient was treated using telemedicine, real time audio and video, according to [facility] protocols. Kasia BO MD, conducted the visit from the [facility]. The patient participated in the visit at a non-[facility] location selected by the patient, identified below. I am licensed in the state where the patient stated they are located. The patient stated that they understood and accepted the privacy and security risks to their information at their location. This visit is not recorded.    Patient was located at the patient's home.       Nikki Rodriguez is a 33 y.o. female here today for a telemedicine visit.   Acute issues  Herpes   Patient reports that she is been having shingles in her vagina x3 recently.  She is undergoing a lot of stress because is about to undergo divorce process.  Denies of any depression or anxiety.  Has strong support system in her family    Chronic conditions  Anxiety  Attention deficit hyperactivity disorder (ADHD), predominantly   hyperactive type  Bipolar affective  Bipolar disorder  Depression  Major depressive disorder   PHQ-9 score today is 0   Denies of any suicidal/homicidal symptoms   Seeing Psychiatrist  Past Surgical History:   Procedure Laterality Date    BREAST SURGERY  2012    Lumpectomy Lt Breast       Review of patient's allergies indicates:   Allergen Reactions    Azithromycin Itching, Rash and Hives       Current Outpatient Medications   Medication Instructions    acyclovir (ZOVIRAX) 800 mg, Oral, 2 times daily    copper intrauterine device (PARAGARD T 380A) 380 square mm IUD ParaGard intrauterine device  Start Date: 7/21/21  Status: Ordered    NASRA 24 FE 1 mg-20 mcg (24)/75 mg (4) per tablet 1 tablet, Oral, Daily       Social History     Socioeconomic History    Marital status: Legally    Tobacco Use    Smoking status:  Never     Passive exposure: Never    Smokeless tobacco: Never   Substance and Sexual Activity    Alcohol use: Yes     Alcohol/week: 2.0 standard drinks of alcohol     Types: 2 Cans of beer per week     Comment: one time a week    Drug use: Never    Sexual activity: Yes     Partners: Male     Birth control/protection: I.U.D.     Comment: Copper/paraguard        Family History   Problem Relation Age of Onset    Vision loss Mother         Glaucoma    Cancer Father         Stomach    Heart disease Maternal Grandfather     Hypertension Maternal Grandfather     Diabetes Maternal Grandmother     Hypertension Maternal Grandmother     Cancer Paternal Grandmother         Beast, lung    Cancer Maternal Aunt         Breast triple neg    Cancer Paternal Aunt         Breat and ovarian    Cancer Paternal Aunt         Breast        Patient Care Team:  Kasia Rodríguez MD as PCP - General (Family Medicine)      Subjective:       ROS    See HPI for details    Constitutional: Denies Change in appetite. Denies Chills. Denies Fever. Denies Night sweats.  Eye: Denies Blurred vision. Denies Discharge. Denies Eye pain.  ENT: Denies Decreased hearing. Denies Sore throat. Denies Swollen glands.  Respiratory: Denies Cough. Denies Shortness of breath. Denies Shortness of breath with exertion. Denies Wheezing.  Cardiovascular: DeniesChest pain at rest. Denies Chest pain with exertion. Denies Irregular heartbeat. Denies Palpitations. Denies Edema.  Gastrointestinal: Denies Abdominal pain. DeniesDiarrhea. Denies Nausea. Denies Vomiting. Denies Hematemesis or Hematochezia.  Genitourinary: Denies Dysuria. Denies Urinary frequency. Denies Urinary urgency. Denies Blood in urine.  Endocrine: Denies Cold intolerance. Denies Excessive thirst. Denies Heat intolerance. Denies Weight loss. Denies Weight gain.  Musculoskeletal: Denies Painful joints. Denies Weakness.  Integumentary: Denies Rash. Denies Itching. Denies Dry skin.  Neurologic: Denies  "Dizziness. Denies Fainting. Denies Headache.  Psychiatric: Denies Depression. Denies Anxiety. Denies Suicidal/Homicidal ideations.    All Other ROS: Negative except as stated in HPI.       Objective:     Visit Vitals  Ht 5' 2" (1.575 m)   Wt 54.4 kg (120 lb)   BMI 21.95 kg/m²       Physical Exam    Physical Exam: LIMITED DUE TO TELEMEDICINE RESTRICTIONS.  General: Alert and oriented, No acute distress.  Head: Normocephalic, Atraumatic.  Eye: Sclera non-icteric.  Neck/Thyroid:  Full range of motion.  Respiratory: Non-labored respirations, Symmetrical chest wall expansion.  Musculoskeletal: Normal range of motion.  Integumentary:  No visible suspicious lesions or rashes. No diaphoresis.   Neurologic: No focal deficits  Psychiatric: Normal interaction, Coherent speech, Euthymic mood, Appropriate affect       Assessment:       ICD-10-CM ICD-9-CM   1. Herpes simplex vulvovaginitis  A60.04 054.11   2. Bipolar affective disorder, remission status unspecified  F31.9 296.80        Plan:     1. Herpes simplex vulvovaginitis  -     valACYclovir (VALTREX) 1000 MG tablet; Take 1 tablet (1,000 mg total) by mouth once daily.  Dispense: 30 tablet; Refill: 11    2. Bipolar affective disorder, remission status unspecified  Recommend to practice mental health exercises like yoga and keep scheduled visit with psychiatrist   ER precautions provided regarding suicidal/homicidal feeling         Medication List with Changes/Refills   Current Medications    ACYCLOVIR (ZOVIRAX) 800 MG TAB    Take 800 mg by mouth 2 (two) times daily.       Start Date: 7/14/2023 End Date: --    COPPER INTRAUTERINE DEVICE (PARAGARD T 380A) 380 SQUARE MM IUD    ParaGard intrauterine device  Start Date: 7/21/21  Status: Ordered       Start Date: 7/21/2021 End Date: --    NASRA 24 FE 1 MG-20 MCG (24)/75 MG (4) PER TABLET    Take 1 tablet by mouth once daily.       Start Date: 7/26/2023 End Date: --          Follow up for Annual Wellness keep scheduled annual " wellness. In addition to their scheduled follow up, the patient has also been instructed to follow up on as needed basis.       Video Time Documentation:  Spent 10 minutes with patient face to face discussed health concerns. More than 50% of this time was spent in counseling and coordination of care.  Answers submitted by the patient for this visit:  Review of Systems Questionnaire (Submitted on 7/31/2023)  activity change: No  unexpected weight change: No  rhinorrhea: No  trouble swallowing: No  visual disturbance: No  chest tightness: No  polyuria: No  difficulty urinating: No  menstrual problem: No  joint swelling: No  arthralgias: No  confusion: No  dysphoric mood: No

## 2023-11-06 ENCOUNTER — OFFICE VISIT (OUTPATIENT)
Dept: FAMILY MEDICINE | Facility: CLINIC | Age: 33
End: 2023-11-06
Payer: COMMERCIAL

## 2023-11-06 VITALS
TEMPERATURE: 98 F | BODY MASS INDEX: 21.44 KG/M2 | OXYGEN SATURATION: 99 % | RESPIRATION RATE: 17 BRPM | HEART RATE: 65 BPM | DIASTOLIC BLOOD PRESSURE: 60 MMHG | HEIGHT: 62 IN | SYSTOLIC BLOOD PRESSURE: 100 MMHG | WEIGHT: 116.5 LBS

## 2023-11-06 DIAGNOSIS — Z13.29 SCREENING FOR THYROID DISORDER: ICD-10-CM

## 2023-11-06 DIAGNOSIS — F31.9 BIPOLAR AFFECTIVE DISORDER, REMISSION STATUS UNSPECIFIED: Chronic | ICD-10-CM

## 2023-11-06 DIAGNOSIS — F41.9 ANXIETY: Chronic | ICD-10-CM

## 2023-11-06 DIAGNOSIS — A60.04 HERPES SIMPLEX VULVOVAGINITIS: ICD-10-CM

## 2023-11-06 DIAGNOSIS — Z00.00 WELLNESS EXAMINATION: Primary | ICD-10-CM

## 2023-11-06 DIAGNOSIS — Z13.1 ENCOUNTER FOR SCREENING FOR DIABETES MELLITUS: ICD-10-CM

## 2023-11-06 DIAGNOSIS — Z13.6 ENCOUNTER FOR LIPID SCREENING FOR CARDIOVASCULAR DISEASE: ICD-10-CM

## 2023-11-06 DIAGNOSIS — F32.5 MAJOR DEPRESSIVE DISORDER WITH SINGLE EPISODE, IN FULL REMISSION: Chronic | ICD-10-CM

## 2023-11-06 DIAGNOSIS — F90.1 ATTENTION DEFICIT HYPERACTIVITY DISORDER (ADHD), PREDOMINANTLY HYPERACTIVE TYPE: Chronic | ICD-10-CM

## 2023-11-06 DIAGNOSIS — Z00.00 WELL ADULT HEALTH CHECK: ICD-10-CM

## 2023-11-06 DIAGNOSIS — Z23 NEEDS FLU SHOT: ICD-10-CM

## 2023-11-06 DIAGNOSIS — Z13.220 ENCOUNTER FOR LIPID SCREENING FOR CARDIOVASCULAR DISEASE: ICD-10-CM

## 2023-11-06 PROCEDURE — 99395 PR PREVENTIVE VISIT,EST,18-39: ICD-10-PCS | Mod: ,,, | Performed by: STUDENT IN AN ORGANIZED HEALTH CARE EDUCATION/TRAINING PROGRAM

## 2023-11-06 PROCEDURE — 99395 PREV VISIT EST AGE 18-39: CPT | Mod: ,,, | Performed by: STUDENT IN AN ORGANIZED HEALTH CARE EDUCATION/TRAINING PROGRAM

## 2023-11-06 PROCEDURE — 3078F DIAST BP <80 MM HG: CPT | Mod: CPTII,,, | Performed by: STUDENT IN AN ORGANIZED HEALTH CARE EDUCATION/TRAINING PROGRAM

## 2023-11-06 PROCEDURE — 1160F PR REVIEW ALL MEDS BY PRESCRIBER/CLIN PHARMACIST DOCUMENTED: ICD-10-PCS | Mod: CPTII,,, | Performed by: STUDENT IN AN ORGANIZED HEALTH CARE EDUCATION/TRAINING PROGRAM

## 2023-11-06 PROCEDURE — 1159F MED LIST DOCD IN RCRD: CPT | Mod: CPTII,,, | Performed by: STUDENT IN AN ORGANIZED HEALTH CARE EDUCATION/TRAINING PROGRAM

## 2023-11-06 PROCEDURE — 3074F SYST BP LT 130 MM HG: CPT | Mod: CPTII,,, | Performed by: STUDENT IN AN ORGANIZED HEALTH CARE EDUCATION/TRAINING PROGRAM

## 2023-11-06 PROCEDURE — 3078F PR MOST RECENT DIASTOLIC BLOOD PRESSURE < 80 MM HG: ICD-10-PCS | Mod: CPTII,,, | Performed by: STUDENT IN AN ORGANIZED HEALTH CARE EDUCATION/TRAINING PROGRAM

## 2023-11-06 PROCEDURE — 3008F PR BODY MASS INDEX (BMI) DOCUMENTED: ICD-10-PCS | Mod: CPTII,,, | Performed by: STUDENT IN AN ORGANIZED HEALTH CARE EDUCATION/TRAINING PROGRAM

## 2023-11-06 PROCEDURE — 3008F BODY MASS INDEX DOCD: CPT | Mod: CPTII,,, | Performed by: STUDENT IN AN ORGANIZED HEALTH CARE EDUCATION/TRAINING PROGRAM

## 2023-11-06 PROCEDURE — 1160F RVW MEDS BY RX/DR IN RCRD: CPT | Mod: CPTII,,, | Performed by: STUDENT IN AN ORGANIZED HEALTH CARE EDUCATION/TRAINING PROGRAM

## 2023-11-06 PROCEDURE — 3074F PR MOST RECENT SYSTOLIC BLOOD PRESSURE < 130 MM HG: ICD-10-PCS | Mod: CPTII,,, | Performed by: STUDENT IN AN ORGANIZED HEALTH CARE EDUCATION/TRAINING PROGRAM

## 2023-11-06 PROCEDURE — 1159F PR MEDICATION LIST DOCUMENTED IN MEDICAL RECORD: ICD-10-PCS | Mod: CPTII,,, | Performed by: STUDENT IN AN ORGANIZED HEALTH CARE EDUCATION/TRAINING PROGRAM

## 2023-11-06 NOTE — PROGRESS NOTES
Patient ID: [id number]     Chief Complaint: Annual Exam         Disclaimer:  This note is prepared using voice recognition software and as such is likely to have errors despite attempts at proofreading. Please contact me for questions.    Nikki Rodriguez is a 33 y.o. female here today for an annual wellness visit.    Patient has following issues to discuss today    Acute Issues-  Overall feels good   No new concerns    Herpes genitalis   Currently on suppressive therapy Valtrex  Reports of having no lesion after being on Valtrex     Chronic Issues-    Anxiety  Bipolar affective  Major depressive disorder   Well-controlled without medications   Denies of any suicidal/homicidal symptoms    Attention deficit hyperactivity disorder (ADHD), predominantly   hyperactive type  Well-controlled, not on meds    Past Surgical History:   Procedure Laterality Date    BREAST SURGERY  2012    Lumpectomy Lt Breast       Review of patient's allergies indicates:   Allergen Reactions    Azithromycin Itching, Rash and Hives       Current Outpatient Medications   Medication Instructions    copper intrauterine device (PARAGARD T 380A) 380 square mm IUD ParaGard intrauterine device  Start Date: 7/21/21  Status: Ordered    NASRA 24 FE 1 mg-20 mcg (24)/75 mg (4) per tablet 1 tablet, Oral, Daily    valACYclovir (VALTREX) 1,000 mg, Oral, Daily       Social History     Socioeconomic History    Marital status: Legally    Tobacco Use    Smoking status: Never     Passive exposure: Never    Smokeless tobacco: Never   Substance and Sexual Activity    Alcohol use: Yes     Alcohol/week: 2.0 standard drinks of alcohol     Types: 2 Cans of beer per week     Comment: one time a week    Drug use: Never    Sexual activity: Yes     Partners: Male     Birth control/protection: OCP     Comment: Copper/paraguard        Family History   Problem Relation Age of Onset    Vision loss Mother         Glaucoma    Arthritis Mother     Cancer Father          "Stomach    Heart disease Maternal Grandfather     Hypertension Maternal Grandfather     Diabetes Maternal Grandmother     Hypertension Maternal Grandmother     COPD Maternal Grandmother     Hearing loss Maternal Grandmother     Cancer Paternal Grandmother         Beast, lung    Cancer Maternal Aunt         Breast triple neg    Cancer Paternal Aunt         Breat and ovarian    Cancer Paternal Aunt         Breast        Patient Care Team:  Kasia Rodríguez MD as PCP - General (Family Medicine)       Subjective:     ROS    See HPI for details    Constitutional: Denies Change in appetite. Denies Chills. Denies Fever. Denies Night sweats.  Respiratory: Denies Cough. Denies Shortness of breath. Denies Shortness of breath with exertion. Denies Wheezing.  Cardiovascular: DeniesChest pain at rest. Denies Chest pain with exertion. Denies Irregular heartbeat. Denies Palpitations. Denies Edema.  Gastrointestinal: Denies Abdominal pain. DeniesDiarrhea. Denies Nausea. Denies Vomiting. Denies Hematemesis or Hematochezia.  Genitourinary: Denies Dysuria. Denies Urinary frequency. Denies Urinary urgency. Denies Blood in urine.  Endocrine: Denies Cold intolerance. Denies Excessive thirst. Denies Heat intolerance. Denies Weight loss. Denies Weight gain.  Musculoskeletal: Denies Painful joints. Denies Weakness.  Integumentary: Denies Rash. Denies Itching. Denies Dry skin.  Neurologic: Denies Dizziness. Denies Fainting. Denies Headache.  Psychiatric: Denies Depression. Denies Anxiety. Denies Suicidal/Homicidal ideations.    All Other ROS: Negative except as stated in HPI.       Objective:     Visit Vitals  /60 (BP Location: Right arm, Patient Position: Sitting, BP Method: Large (Manual))   Pulse 65   Temp 98.2 °F (36.8 °C) (Oral)   Resp 17   Ht 5' 2" (1.575 m)   Wt 52.8 kg (116 lb 8 oz)   SpO2 99%   BMI 21.31 kg/m²       Physical Exam    General: Alert and oriented, No acute distress.  Neck/Thyroid: Supple, " Non-tender  Respiratory: Clear to auscultation bilaterally; No wheezes  Cardiovascular: Regular rate and rhythm  Gastrointestinal: Soft, Non-tender,No palpable organomegaly.  Musculoskeletal: Normal range of motion.  Neurologic: No focal deficits  Psychiatric: Normal interaction, Coherent speech, Euthymic mood, Appropriate affect       Assessment:       ICD-10-CM ICD-9-CM   1. Wellness examination  Z00.00 V70.0   2. Anxiety  F41.9 300.00   3. Attention deficit hyperactivity disorder (ADHD), predominantly hyperactive type  F90.1 314.01   4. Bipolar affective disorder, remission status unspecified  F31.9 296.80   5. Major depressive disorder with single episode, in full remission  F32.5 296.26   6. Screening for thyroid disorder  Z13.29 V77.0   7. Encounter for lipid screening for cardiovascular disease  Z13.220 V77.91    Z13.6 V81.2   8. Encounter for screening for diabetes mellitus  Z13.1 V77.1   9. Needs flu shot  Z23 V04.81   10. Well adult health check  Z00.00 V70.0   11. Herpes simplex vulvovaginitis  A60.04 054.11        Plan:         Health Maintenance Topics with due status: Not Due       Topic Last Completion Date    TETANUS VACCINE 09/06/2016    Cervical Cancer Screening 06/06/2023          Vaccinations -   Immunization History   Administered Date(s) Administered    COVID-19, MRNA, LN-S, PF (Pfizer) (Purple Cap) 08/02/2021, 08/23/2021    Influenza - Quadrivalent - PF *Preferred* (6 months and older) 10/05/2016, 10/15/2021, 11/02/2022    Influenza - Trivalent - PF (ADULT) 10/17/2017, 10/15/2020    Tdap 09/06/2016        1. Wellness examination  SINCE PATIENT HAS MULTIPLE FAMILY MEMBERS HAVING CANCER, I OFFERED DELIVERY TEST TO THE PATIENT.  SHE VOICED UNDERSTANDING AND AGREES WITH THE PLAN.  I ALSO UPDATED THAT IT WILL BE HER RESPONSIBILITY AS IT MIGHT NOT BE COVERED BY INSURANCE.  PATIENT WOULD LIKE TO GO AHEAD AND GET THE TEST DONE.  -     CBC Auto Differential; Future; Expected date: 11/06/2023  -      Comprehensive Metabolic Panel; Future; Expected date: 11/06/2023  -     Urinalysis; Future; Expected date: 11/06/2023  Cervical Cancer Screening - Last Pap on 6/6/23.  Follow up with GYN Clinic for Pap/Pelvic.  Breast Cancer Screening - Last mammogram wnl. Will start at 40. Maternal Aunt has cancer.  Reports of having cancer in her family and would like to know more about gene testing.   Colon Cancer Screening -  at 45   Osteoporosis Screening -  DEXA   Eye Exam - Recommend annually.  Dental Exam - Recommend biannual exams.     Diet discussed (healthy food choices, reduce portions and overall calorie intake)  Exercise 30-45 minutes 5x per week  Avoid excessive alcohol and tobacco if taking  Immunizations dicussed  Monthly breast exam recommended  Preventative exams discussed.      2. Anxiety  3. Attention deficit hyperactivity disorder (ADHD), predominantly hyperactive type  4. Bipolar affective disorder, remission status unspecified  5. Major depressive disorder with single episode, in full remission  Well-controlled  We will continue to monitor   Recommend to continue mental health exercises like yoga     6. Screening for thyroid disorder  -     TSH; Future; Expected date: 11/06/2023  -     TSH; Future; Expected date: 11/06/2024    7. Encounter for lipid screening for cardiovascular disease  -     Lipid Panel; Future; Expected date: 11/06/2023  -     Lipid Panel; Future; Expected date: 11/06/2024    8. Encounter for screening for diabetes mellitus  -     Hemoglobin A1C; Future; Expected date: 11/06/2023  -     Hemoglobin A1C; Future; Expected date: 11/06/2024    9. Herpes simplex vulvovaginitis  Continue Valtrex suppression therapy as prescribed    10. Well adult health check  -     CBC Auto Differential; Future; Expected date: 11/06/2024  -     Comprehensive Metabolic Panel; Future; Expected date: 11/06/2024  -     Urinalysis; Future; Expected date: 11/06/2024        Medication List with Changes/Refills   Current  Medications    COPPER INTRAUTERINE DEVICE (PARAGARD T 380A) 380 SQUARE MM IUD    ParaGard intrauterine device  Start Date: 7/21/21  Status: Ordered       Start Date: 7/21/2021 End Date: --    NASRA 24 FE 1 MG-20 MCG (24)/75 MG (4) PER TABLET    Take 1 tablet by mouth once daily.       Start Date: 7/26/2023 End Date: --    VALACYCLOVIR (VALTREX) 1000 MG TABLET    Take 1 tablet (1,000 mg total) by mouth once daily.       Start Date: 7/31/2023 End Date: 7/30/2024          The patient's Health Maintenance was reviewed and the following appears to be due at this time:   Health Maintenance Due   Topic Date Due    Influenza Vaccine (1) 09/01/2023    COVID-19 Vaccine (3 - 2023-24 season) 09/01/2023         Follow up in about 1 year (around 11/6/2024) for Annual Wellness after 11/22/23.   In addition to their scheduled follow up, the patient has also been instructed to follow up on as needed basis.

## 2023-11-07 ENCOUNTER — TELEPHONE (OUTPATIENT)
Dept: HEMATOLOGY/ONCOLOGY | Facility: CLINIC | Age: 33
End: 2023-11-07
Payer: COMMERCIAL

## 2023-11-07 DIAGNOSIS — Z12.9 CANCER SCREENING: Primary | ICD-10-CM

## 2023-11-07 NOTE — TELEPHONE ENCOUNTER
I called patient to review Galleri testing, lvm, and sending mychart with additional information. Patient was requesting more genetic/germline history because of extensive cancer history with her family. Her father and all of his siblings, grandparents, etc. Consult placed for [facility]. Mychart sent with Javed inform per pt's request for future if needed. Patient verbalized understanding and grateful for the call.

## 2023-11-25 ENCOUNTER — OFFICE VISIT (OUTPATIENT)
Dept: URGENT CARE | Facility: CLINIC | Age: 33
End: 2023-11-25
Payer: COMMERCIAL

## 2023-11-25 VITALS
RESPIRATION RATE: 16 BRPM | WEIGHT: 116 LBS | TEMPERATURE: 98 F | BODY MASS INDEX: 21.35 KG/M2 | OXYGEN SATURATION: 100 % | HEIGHT: 62 IN | DIASTOLIC BLOOD PRESSURE: 70 MMHG | SYSTOLIC BLOOD PRESSURE: 105 MMHG | HEART RATE: 69 BPM

## 2023-11-25 DIAGNOSIS — N30.01 ACUTE CYSTITIS WITH HEMATURIA: Primary | ICD-10-CM

## 2023-11-25 DIAGNOSIS — R30.9 PAINFUL URINATION: ICD-10-CM

## 2023-11-25 LAB
BILIRUB UR QL STRIP: NEGATIVE
GLUCOSE UR QL STRIP: NEGATIVE
KETONES UR QL STRIP: NEGATIVE
LEUKOCYTE ESTERASE UR QL STRIP: POSITIVE
PH, POC UA: 6
POC BLOOD, URINE: POSITIVE
POC NITRATES, URINE: POSITIVE
PROT UR QL STRIP: POSITIVE
SP GR UR STRIP: 1.01 (ref 1–1.03)
UROBILINOGEN UR STRIP-ACNC: ABNORMAL (ref 0.1–1.1)

## 2023-11-25 PROCEDURE — 81003 URINALYSIS AUTO W/O SCOPE: CPT | Mod: QW,,, | Performed by: FAMILY MEDICINE

## 2023-11-25 PROCEDURE — 99213 PR OFFICE/OUTPT VISIT, EST, LEVL III, 20-29 MIN: ICD-10-PCS | Mod: ,,, | Performed by: FAMILY MEDICINE

## 2023-11-25 PROCEDURE — 87077 CULTURE AEROBIC IDENTIFY: CPT | Performed by: FAMILY MEDICINE

## 2023-11-25 PROCEDURE — 81003 POCT URINALYSIS, DIPSTICK, AUTOMATED, W/O SCOPE: ICD-10-PCS | Mod: QW,,, | Performed by: FAMILY MEDICINE

## 2023-11-25 PROCEDURE — 99213 OFFICE O/P EST LOW 20 MIN: CPT | Mod: ,,, | Performed by: FAMILY MEDICINE

## 2023-11-25 RX ORDER — PHENAZOPYRIDINE HYDROCHLORIDE 200 MG/1
200 TABLET, FILM COATED ORAL 3 TIMES DAILY PRN
Qty: 6 TABLET | Refills: 0 | Status: SHIPPED | OUTPATIENT
Start: 2023-11-25 | End: 2023-11-27

## 2023-11-25 RX ORDER — NITROFURANTOIN 25; 75 MG/1; MG/1
100 CAPSULE ORAL 2 TIMES DAILY
Qty: 14 EACH | Refills: 0 | Status: SHIPPED | OUTPATIENT
Start: 2023-11-25 | End: 2023-12-02

## 2023-11-25 NOTE — PATIENT INSTRUCTIONS
Adequate hydration. Medications as prescribed.   Urine Culture results will be monitored and reported, and treatment changes made if necessary.  ER precautions with worsening symptoms, fever, flank pain, not able to urinate.   Call or return to clinic as needed. Voiced understanding verbally.  Urine dipstick results shared, positive for leukocytes, positive for blood, positive for protein and nitrates

## 2023-11-25 NOTE — PROGRESS NOTES
"Subjective:      Patient ID: Nikki Rodriguez is a 33 y.o. female.    Vitals:  height is 5' 2" (1.575 m) and weight is 52.6 kg (116 lb). Her temperature is 98.4 °F (36.9 °C). Her blood pressure is 105/70 and her pulse is 69. Her respiration is 16 and oxygen saturation is 100%.     Chief Complaint: Dysuria (Painful urination, blood in urine x 3 am this morning )    HPI:  33-year-old female present to clinic with concerns of burning with urination, urgency and frequency, noticed blood in the urine this morning.  No concerns of vaginal bleeding.  LMP per nurse's note.  No fever, no flank pain.  No history of kidney stones.  Last UTI 2011    ROS :  Constitutional : _No fever, no fatigue or weakness  Neck : _Negative except HPI  Respiratory :_ No coughing, no shortness of breath  Cardiovascular : _No chest pain, no palpitations  Gastrointestinal : _No nausea, no vomiting  Genitourinary : _No flank pain, no vaginal discharge  Integumentary : _Negative for skin rash   Objective:     Physical Exam  General : Alert and Oriented, No apparent distress, afebrile  Neck - supple  Respiratory : Bilateral equal breath sounds, nonlabored respirations, clear to auscultate   Cardiovascular : Rate rhythm regular, normal volume pulse  Gastrointestinal : Soft, mild suprapubic pressure on palpation , BS X 4 quadrants - normal  Genitourinary : No CVA tenderness Bilateral  Integumentary : Warm, Dry and no rash   Assessment:     1. Acute cystitis with hematuria    2. Painful urination      Plan:   Adequate hydration. Medications as prescribed.   Urine Culture results will be monitored and reported, and treatment changes made if necessary.  ER precautions with worsening symptoms, fever, flank pain, not able to urinate.   Call or return to clinic as needed. Voiced understanding verbally.  Urine dipstick results shared, positive for leukocytes, positive for blood, positive for protein and nitrates    Acute cystitis with hematuria  -     Urine " Culture High Risk  -     nitrofurantoin, macrocrystal-monohydrate, (MACROBID) 100 MG capsule; Take 1 capsule (100 mg total) by mouth 2 (two) times daily. for 7 days  Dispense: 14 each; Refill: 0  -     phenazopyridine (PYRIDIUM) 200 MG tablet; Take 1 tablet (200 mg total) by mouth 3 (three) times daily as needed for Pain.  Dispense: 6 tablet; Refill: 0    Painful urination  -     POCT Urinalysis, Dipstick, Automated, W/O Scope

## 2023-11-27 LAB — BACTERIA UR CULT: ABNORMAL

## 2023-12-14 ENCOUNTER — OFFICE VISIT (OUTPATIENT)
Dept: URGENT CARE | Facility: CLINIC | Age: 33
End: 2023-12-14
Payer: COMMERCIAL

## 2023-12-14 VITALS
RESPIRATION RATE: 18 BRPM | DIASTOLIC BLOOD PRESSURE: 76 MMHG | OXYGEN SATURATION: 100 % | HEIGHT: 62 IN | BODY MASS INDEX: 21.35 KG/M2 | HEART RATE: 67 BPM | WEIGHT: 116 LBS | TEMPERATURE: 99 F | SYSTOLIC BLOOD PRESSURE: 116 MMHG

## 2023-12-14 DIAGNOSIS — N39.0 URINARY TRACT INFECTION WITH HEMATURIA, SITE UNSPECIFIED: Primary | ICD-10-CM

## 2023-12-14 DIAGNOSIS — R31.9 URINARY TRACT INFECTION WITH HEMATURIA, SITE UNSPECIFIED: Primary | ICD-10-CM

## 2023-12-14 DIAGNOSIS — R30.0 DYSURIA: ICD-10-CM

## 2023-12-14 LAB
BILIRUB UR QL STRIP: NEGATIVE
GLUCOSE UR QL STRIP: NEGATIVE
KETONES UR QL STRIP: NEGATIVE
LEUKOCYTE ESTERASE UR QL STRIP: POSITIVE
PH, POC UA: 6
POC BLOOD, URINE: POSITIVE
POC NITRATES, URINE: NEGATIVE
PROT UR QL STRIP: NEGATIVE
SP GR UR STRIP: 1 (ref 1–1.03)
UROBILINOGEN UR STRIP-ACNC: ABNORMAL (ref 0.1–1.1)

## 2023-12-14 PROCEDURE — 99213 PR OFFICE/OUTPT VISIT, EST, LEVL III, 20-29 MIN: ICD-10-PCS | Mod: ,,, | Performed by: FAMILY MEDICINE

## 2023-12-14 PROCEDURE — 99213 OFFICE O/P EST LOW 20 MIN: CPT | Mod: ,,, | Performed by: FAMILY MEDICINE

## 2023-12-14 PROCEDURE — 81003 POCT URINALYSIS, DIPSTICK, AUTOMATED, W/O SCOPE: ICD-10-PCS | Mod: QW,,, | Performed by: FAMILY MEDICINE

## 2023-12-14 PROCEDURE — 81003 URINALYSIS AUTO W/O SCOPE: CPT | Mod: QW,,, | Performed by: FAMILY MEDICINE

## 2023-12-14 PROCEDURE — 87077 CULTURE AEROBIC IDENTIFY: CPT | Performed by: FAMILY MEDICINE

## 2023-12-14 RX ORDER — CIPROFLOXACIN 500 MG/1
500 TABLET ORAL EVERY 12 HOURS
Qty: 14 TABLET | Refills: 0 | Status: SHIPPED | OUTPATIENT
Start: 2023-12-14 | End: 2023-12-21

## 2023-12-14 NOTE — PROGRESS NOTES
"Subjective:      Patient ID: Nikki Rodriguez is a 33 y.o. female.    Vitals:  height is 5' 2" (1.575 m) and weight is 52.6 kg (116 lb). Her temperature is 98.6 °F (37 °C). Her blood pressure is 116/76 and her pulse is 67. Her respiration is 18 and oxygen saturation is 100%.     Chief Complaint: Urinary Tract Infection (Burning with urination and frequency started 2 days ago)    33-year-old female presents to clinic complaining of a one-day history of urinary burning and frequency.  Denies any back pain belly pain or fever.  Patient was here November 25th and diagnosed with a UTI.  Was placed on Macrobid.  She did finish the antibiotics.  However she feels like the symptoms did not completely go away after finishing Macrobid.        Constitution: Negative.   HENT: Negative.     Cardiovascular: Negative.    Eyes: Negative.    Respiratory: Negative.     Gastrointestinal: Negative.    Genitourinary:  Positive for dysuria and frequency.   Musculoskeletal: Negative.    Skin: Negative.    Allergic/Immunologic: Negative.    Neurological: Negative.    Hematologic/Lymphatic: Negative.       Objective:     Physical Exam   Constitutional: She is oriented to person, place, and time. She appears well-developed. She is cooperative.  Non-toxic appearance. She does not appear ill. No distress.   HENT:   Head: Normocephalic and atraumatic.   Eyes: Conjunctivae and lids are normal.   Neck: Trachea normal and phonation normal. Neck supple.   Pulmonary/Chest: Effort normal. No respiratory distress.   Abdominal: Normal appearance. Soft. There is no abdominal tenderness. There is no rebound, no guarding, no left CVA tenderness and no right CVA tenderness.   Neurological: She is alert and oriented to person, place, and time. She exhibits normal muscle tone.   Skin: Skin is warm, dry, intact and not diaphoretic.   Psychiatric: Her speech is normal and behavior is normal. Mood, judgment and thought content normal.   Nursing note and vitals " "reviewed.         Previous History      Review of patient's allergies indicates:   Allergen Reactions    Azithromycin Itching, Rash and Hives       Past Medical History:   Diagnosis Date    Anxiety 11/02/2022    Attention deficit hyperactivity disorder (ADHD), predominantly hyperactive type 11/02/2022    Bipolar affective 11/02/2022    Bipolar disorder     Herpes genitalis     Major depressive disorder 11/02/2022     Current Outpatient Medications   Medication Instructions    ciprofloxacin HCl (CIPRO) 500 mg, Oral, Every 12 hours    NASRA 24 FE 1 mg-20 mcg (24)/75 mg (4) per tablet 1 tablet, Oral, Daily    valACYclovir (VALTREX) 1,000 mg, Oral, Daily     Past Surgical History:   Procedure Laterality Date    BREAST SURGERY  2012    Lumpectomy Lt Breast     Family History   Problem Relation Age of Onset    Vision loss Mother         Glaucoma    Arthritis Mother     Cancer Father         Stomach    No Known Problems Sister     No Known Problems Brother     Cancer Maternal Aunt         Breast triple neg    Cancer Paternal Aunt         Breat and ovarian    Cancer Paternal Aunt         Breast    Diabetes Maternal Grandmother     Hypertension Maternal Grandmother     COPD Maternal Grandmother     Hearing loss Maternal Grandmother     Heart disease Maternal Grandfather     Hypertension Maternal Grandfather     Cancer Paternal Grandmother         Beast, lung       Social History     Tobacco Use    Smoking status: Never     Passive exposure: Never    Smokeless tobacco: Never   Substance Use Topics    Alcohol use: Yes     Alcohol/week: 2.0 standard drinks of alcohol     Types: 2 Cans of beer per week     Comment: one time a week    Drug use: Never        Physical Exam      Vital Signs Reviewed   /76   Pulse 67   Temp 98.6 °F (37 °C)   Resp 18   Ht 5' 2" (1.575 m)   Wt 52.6 kg (116 lb)   SpO2 100%   BMI 21.22 kg/m²        Procedures    Procedures     Labs     Results for orders placed or performed in visit on " 12/14/23   POCT Urinalysis, Dipstick, Automated, W/O Scope   Result Value Ref Range    POC Blood, Urine Positive (A) Negative, Positive Slide, Positive Tube    POC Bilirubin, Urine Negative Negative, Positive Slide, Positive Tube    POC Urobilinogen, Urine norm 0.1 - 1.1    POC Ketones, Urine Negative Negative, Positive Slide, Positive Tube    POC Protein, Urine Negative Negative, Positive Slide, Positive Tube    POC Nitrates, Urine Negative Negative, Positive Slide, Positive Tube    POC Glucose, Urine Negative Negative, Positive Slide, Positive Tube    pH, UA 6     POC Specific Gravity, Urine 1.005 1.003 - 1.029    POC Leukocytes, Urine Positive (A) Negative, Positive Slide, Positive Tube       Assessment:     1. Urinary tract infection with hematuria, site unspecified    2. Dysuria        Plan:   Medication sent to pharmacy.  We will culture urine and call you with the results when they become available.  Monitor for fever.  Hydrate.  If your symptoms persist or worsen or you develop fever back pain or belly pain return to clinic or seek medical attention immediately      Urinary tract infection with hematuria, site unspecified  -     Urine culture    Dysuria  -     POCT Urinalysis, Dipstick, Automated, W/O Scope    Other orders  -     ciprofloxacin HCl (CIPRO) 500 MG tablet; Take 1 tablet (500 mg total) by mouth every 12 (twelve) hours. for 7 days  Dispense: 14 tablet; Refill: 0

## 2023-12-14 NOTE — PATIENT INSTRUCTIONS
Plan:   Medication sent to pharmacy.  We will culture urine and call you with the results when they become available.  Monitor for fever.  Hydrate.  If your symptoms persist or worsen or you develop fever back pain or belly pain return to clinic or seek medical attention immediately

## 2023-12-15 LAB — BACTERIA UR CULT: ABNORMAL

## 2024-01-12 ENCOUNTER — OFFICE VISIT (OUTPATIENT)
Dept: URGENT CARE | Facility: CLINIC | Age: 34
End: 2024-01-12
Payer: COMMERCIAL

## 2024-01-12 VITALS
HEIGHT: 62 IN | WEIGHT: 120 LBS | RESPIRATION RATE: 16 BRPM | BODY MASS INDEX: 22.08 KG/M2 | DIASTOLIC BLOOD PRESSURE: 86 MMHG | OXYGEN SATURATION: 99 % | SYSTOLIC BLOOD PRESSURE: 119 MMHG | HEART RATE: 80 BPM | TEMPERATURE: 99 F

## 2024-01-12 DIAGNOSIS — R00.2 PALPITATIONS: Primary | ICD-10-CM

## 2024-01-12 LAB
EKG 12-LEAD: NORMAL
PR INTERVAL: NORMAL
PRT AXES: NORMAL
QRS DURATION: NORMAL
QT/QTC: NORMAL
VENTRICULAR RATE: NORMAL

## 2024-01-12 PROCEDURE — 99214 OFFICE O/P EST MOD 30 MIN: CPT | Mod: 25,,, | Performed by: FAMILY MEDICINE

## 2024-01-12 PROCEDURE — 93000 ELECTROCARDIOGRAM COMPLETE: CPT | Mod: ,,, | Performed by: FAMILY MEDICINE

## 2024-01-12 RX ORDER — PROPRANOLOL HYDROCHLORIDE 20 MG/1
20 TABLET ORAL 3 TIMES DAILY
Qty: 90 TABLET | Refills: 2 | Status: SHIPPED | OUTPATIENT
Start: 2024-01-12 | End: 2024-04-11

## 2024-01-12 NOTE — PATIENT INSTRUCTIONS
Continue regular exercise.  As needed propranolol for palpitations up to three times a day.  If symptoms remain consider a daily medication.

## 2024-01-12 NOTE — PROGRESS NOTES
"Subjective:      Patient ID: Nikki Rodriguez is a 33 y.o. female.    Vitals:  height is 5' 2" (1.575 m) and weight is 54.4 kg (120 lb). Her temperature is 98.5 °F (36.9 °C). Her blood pressure is 119/86 and her pulse is 80. Her respiration is 16 and oxygen saturation is 99%.     Chief Complaint: Anxiety (Believes she is experiencing anxiety/panic attacks due to high blood pressure readings (134/100 was the highest). Staying around 120/90 for the last 3-4 days. Denies chest pain, just some mild discomfort in chest at times. )    3-4 days pt has feeling more anxious and noticed an elevated bp of 130s/100-90s.  Some palpitations.       Constitution: Positive for fatigue. Negative for fever and generalized weakness.   Neck: Negative for neck pain.   Cardiovascular:  Positive for palpitations. Negative for chest pain and passing out.   Gastrointestinal:  Negative for nausea, vomiting and diarrhea.   Neurological:  Negative for dizziness.      Objective:     Physical Exam   HENT:   Mouth/Throat: Mucous membranes are moist.   Cardiovascular: Normal rate and regular rhythm.   Pulmonary/Chest: Effort normal and breath sounds normal.   Abdominal: Normal appearance.   Neurological: no focal deficit. She is alert.   Skin: Capillary refill takes less than 2 seconds.   Psychiatric: Mood normal.   Nursing note and vitals reviewed.      Assessment:     1. Palpitations        Plan:       Palpitations  -     POCT EKG 12-LEAD (Manually Resulted by Ordering Provider)  -     propranoloL (INDERAL) 20 MG tablet; Take 1 tablet (20 mg total) by mouth 3 (three) times daily.  Dispense: 90 tablet; Refill: 2           EKG NSR, no ectopy.           "

## 2024-02-12 ENCOUNTER — OFFICE VISIT (OUTPATIENT)
Dept: URGENT CARE | Facility: CLINIC | Age: 34
End: 2024-02-12
Payer: COMMERCIAL

## 2024-02-12 VITALS
WEIGHT: 120 LBS | TEMPERATURE: 100 F | SYSTOLIC BLOOD PRESSURE: 109 MMHG | OXYGEN SATURATION: 98 % | HEIGHT: 62 IN | RESPIRATION RATE: 17 BRPM | DIASTOLIC BLOOD PRESSURE: 69 MMHG | BODY MASS INDEX: 22.08 KG/M2 | HEART RATE: 110 BPM

## 2024-02-12 DIAGNOSIS — R50.9 FEVER, UNSPECIFIED FEVER CAUSE: Primary | ICD-10-CM

## 2024-02-12 DIAGNOSIS — J01.40 ACUTE NON-RECURRENT PANSINUSITIS: ICD-10-CM

## 2024-02-12 LAB
CTP QC/QA: YES
MOLECULAR STREP A: NEGATIVE
POC MOLECULAR INFLUENZA A AGN: NEGATIVE
POC MOLECULAR INFLUENZA B AGN: NEGATIVE
SARS-COV-2 RDRP RESP QL NAA+PROBE: NEGATIVE

## 2024-02-12 PROCEDURE — 87635 SARS-COV-2 COVID-19 AMP PRB: CPT | Mod: QW,,, | Performed by: NURSE PRACTITIONER

## 2024-02-12 PROCEDURE — 99213 OFFICE O/P EST LOW 20 MIN: CPT | Mod: ,,, | Performed by: NURSE PRACTITIONER

## 2024-02-12 PROCEDURE — 87502 INFLUENZA DNA AMP PROBE: CPT | Mod: QW,,, | Performed by: NURSE PRACTITIONER

## 2024-02-12 PROCEDURE — 87651 STREP A DNA AMP PROBE: CPT | Mod: QW,,, | Performed by: NURSE PRACTITIONER

## 2024-02-12 RX ORDER — AMOXICILLIN AND CLAVULANATE POTASSIUM 875; 125 MG/1; MG/1
1 TABLET, FILM COATED ORAL EVERY 12 HOURS
Qty: 20 TABLET | Refills: 0 | Status: SHIPPED | OUTPATIENT
Start: 2024-02-12 | End: 2024-02-22

## 2024-02-12 RX ORDER — PREDNISONE 20 MG/1
20 TABLET ORAL 2 TIMES DAILY
Qty: 10 TABLET | Refills: 0 | Status: SHIPPED | OUTPATIENT
Start: 2024-02-12 | End: 2024-02-17

## 2024-02-12 NOTE — PROGRESS NOTES
"     Previous History      Review of patient's allergies indicates:   Allergen Reactions    Azithromycin Itching, Rash and Hives       Past Medical History:   Diagnosis Date    Anxiety 11/02/2022    Attention deficit hyperactivity disorder (ADHD), predominantly hyperactive type 11/02/2022    Bipolar affective 11/02/2022    Bipolar disorder     Herpes genitalis     Major depressive disorder 11/02/2022     Current Outpatient Medications   Medication Instructions    amoxicillin-clavulanate 875-125mg (AUGMENTIN) 875-125 mg per tablet 1 tablet, Oral, Every 12 hours    NASRA 24 FE 1 mg-20 mcg (24)/75 mg (4) per tablet 1 tablet, Oral, Daily    predniSONE (DELTASONE) 20 mg, Oral, 2 times daily    propranoloL (INDERAL) 20 mg, Oral, 3 times daily    valACYclovir (VALTREX) 1,000 mg, Oral, Daily     Past Surgical History:   Procedure Laterality Date    BREAST SURGERY  2012    Lumpectomy Lt Breast     Family History   Problem Relation Age of Onset    Vision loss Mother         Glaucoma    Arthritis Mother     Cancer Father         Stomach    No Known Problems Sister     No Known Problems Brother     Cancer Maternal Aunt         Breast triple neg    Cancer Paternal Aunt         Breat and ovarian    Cancer Paternal Aunt         Breast    Diabetes Maternal Grandmother     Hypertension Maternal Grandmother     COPD Maternal Grandmother     Hearing loss Maternal Grandmother     Heart disease Maternal Grandfather     Hypertension Maternal Grandfather     Cancer Paternal Grandmother         Beast, lung       Social History     Tobacco Use    Smoking status: Never     Passive exposure: Never    Smokeless tobacco: Never   Substance Use Topics    Alcohol use: Yes     Alcohol/week: 2.0 standard drinks of alcohol     Types: 2 Cans of beer per week     Comment: occasional    Drug use: Never        Physical Exam      Vital Signs Reviewed   /69   Pulse 110   Temp 99.9 °F (37.7 °C)   Resp 17   Ht 5' 2" " "(1.575 m)   Wt 54.4 kg (120 lb)   SpO2 98%   BMI 21.95 kg/m²        Procedures    Procedures     Labs     Results for orders placed or performed in visit on 02/12/24   POCT Strep A, Molecular   Result Value Ref Range    Molecular Strep A, POC Negative Negative     Acceptable Yes    POCT COVID-19 Rapid Screening   Result Value Ref Range    POC Rapid COVID Negative Negative     Acceptable Yes    POCT Influenza A/B Molecular   Result Value Ref Range    POC Molecular Influenza A Ag Negative Negative, Not Reported    POC Molecular Influenza B Ag Negative Negative, Not Reported     Acceptable Yes    Subjective:      Patient ID: Nikki Rodriguez is a 33 y.o. female.    Vitals:  height is 5' 2" (1.575 m) and weight is 54.4 kg (120 lb). Her temperature is 99.9 °F (37.7 °C). Her blood pressure is 109/69 and her pulse is 110. Her respiration is 17 and oxygen saturation is 98%.     Chief Complaint: Cough ( Patient is a 33 y.o. female who presents to urgent care with complaints of cough,congestion, fever 100.1, nausea, vomiting, body aches, sore throat x since this morning .  Patient denies diarrhea. )     Patient is a 33 y.o. female who presents to urgent care with complaints of cough, nasal congestion, fever 100.1, nausea, vomiting, body aches, sore throat x since this morning .  Patient denies diarrhea.     Cough  Associated symptoms include chills, a fever and a sore throat.     Constitution: Positive for chills, fatigue and fever.   HENT:  Positive for sinus pressure and sore throat.    Neck: Positive for painful lymph nodes.   Cardiovascular: Negative.    Eyes: Negative.    Respiratory:  Positive for cough.    Gastrointestinal: Negative.    Endocrine: negative.   Genitourinary: Negative.    Musculoskeletal: Negative.    Skin: Negative.    Allergic/Immunologic: Negative.    Neurological: Negative.    Hematologic/Lymphatic: Positive for swollen lymph nodes. "   Psychiatric/Behavioral: Negative.        Objective:     Physical Exam   Constitutional: She is oriented to person, place, and time. She appears well-developed. She is cooperative.   HENT:   Head: Normocephalic and atraumatic.   Ears:   Right Ear: Hearing, external ear and ear canal normal. Tympanic membrane is bulging.   Left Ear: Hearing, external ear and ear canal normal. Tympanic membrane is bulging.   Nose: Congestion present. No mucosal edema or nasal deformity. No epistaxis. Right sinus exhibits maxillary sinus tenderness and frontal sinus tenderness. Left sinus exhibits maxillary sinus tenderness and frontal sinus tenderness.   Mouth/Throat: Uvula is midline and mucous membranes are normal. No trismus in the jaw. Normal dentition. No uvula swelling. Posterior oropharyngeal erythema present.   Eyes: Conjunctivae and lids are normal.   Neck: Trachea normal and phonation normal. Neck supple.   Cardiovascular: Normal rate, regular rhythm, normal heart sounds and normal pulses.   Pulmonary/Chest: Effort normal and breath sounds normal.   Abdominal: Normal appearance and bowel sounds are normal. Soft.   Musculoskeletal: Normal range of motion.         General: Normal range of motion.   Lymphadenopathy:     She has cervical adenopathy.        Right cervical: Superficial cervical adenopathy present.        Left cervical: Superficial cervical adenopathy present.   Neurological: She is alert and oriented to person, place, and time. She exhibits normal muscle tone.   Skin: Skin is warm, dry and intact.   Psychiatric: Her speech is normal and behavior is normal. Judgment and thought content normal.   Nursing note and vitals reviewed.      Assessment:     1. Fever, unspecified fever cause    2. Acute non-recurrent pansinusitis        Plan:   Drink plenty of fluids    Get plenty of rest.    Follow-up with your Primary Care Provider as needed.      Present to the Emergency Department with any significant change or  worsening symptoms.     Fever, unspecified fever cause  -     POCT Strep A, Molecular  -     POCT COVID-19 Rapid Screening  -     POCT Influenza A/B Molecular    Acute non-recurrent pansinusitis  -     amoxicillin-clavulanate 875-125mg (AUGMENTIN) 875-125 mg per tablet; Take 1 tablet by mouth every 12 (twelve) hours. for 10 days  Dispense: 20 tablet; Refill: 0  -     predniSONE (DELTASONE) 20 MG tablet; Take 1 tablet (20 mg total) by mouth 2 (two) times daily. for 5 days  Dispense: 10 tablet; Refill: 0

## 2024-02-22 ENCOUNTER — PATIENT MESSAGE (OUTPATIENT)
Dept: FAMILY MEDICINE | Facility: CLINIC | Age: 34
End: 2024-02-22
Payer: COMMERCIAL

## 2024-02-26 RX ORDER — NORETHINDRONE ACETATE/ETHINYL ESTRADIOL AND FERROUS FUMARATE 1MG-20(24)
1 KIT ORAL DAILY
Qty: 30 TABLET | Refills: 3 | Status: SHIPPED | OUTPATIENT
Start: 2024-02-26 | End: 2024-05-13 | Stop reason: SDUPTHER

## 2024-02-27 ENCOUNTER — TELEPHONE (OUTPATIENT)
Dept: FAMILY MEDICINE | Facility: CLINIC | Age: 34
End: 2024-02-27
Payer: COMMERCIAL

## 2024-02-27 NOTE — TELEPHONE ENCOUNTER
----- Message from Jose Manuel Barrett sent at 2/26/2024 11:04 AM CST -----  .Type:  RX Refill Request    Who Called: Nikki  Refill or New Rx: Refill   RX Name and Strength: NASRA 24 FE 1 mg-20 mcg (24)/75 mg (4) per tablet  How is the patient currently taking it? (ex. 1XDay):  Is this a 30 day or 90 day RX:  Preferred Pharmacy with phone number: [facility] in [place]   Local or Mail Order: Local   Ordering Provider: Anne  Would the patient rather a call back or a response via [facility]?   Best Call Back Number: [phone]  Additional Information: Please call with any questions.

## 2024-05-13 RX ORDER — NORETHINDRONE ACETATE/ETHINYL ESTRADIOL AND FERROUS FUMARATE 1MG-20(24)
1 KIT ORAL DAILY
Qty: 30 TABLET | Refills: 3 | Status: SHIPPED | OUTPATIENT
Start: 2024-05-13

## 2024-05-20 ENCOUNTER — ON-DEMAND VIRTUAL (OUTPATIENT)
Dept: URGENT CARE | Facility: CLINIC | Age: 34
End: 2024-05-20
Payer: COMMERCIAL

## 2024-05-20 DIAGNOSIS — N30.01 ACUTE CYSTITIS WITH HEMATURIA: Primary | ICD-10-CM

## 2024-05-20 PROCEDURE — 99213 OFFICE O/P EST LOW 20 MIN: CPT | Mod: CC,95,, | Performed by: NURSE PRACTITIONER

## 2024-05-20 RX ORDER — PHENAZOPYRIDINE HYDROCHLORIDE 200 MG/1
200 TABLET, FILM COATED ORAL 3 TIMES DAILY PRN
Qty: 6 TABLET | Refills: 0 | Status: SHIPPED | OUTPATIENT
Start: 2024-05-20 | End: 2024-05-22

## 2024-05-20 RX ORDER — NITROFURANTOIN 25; 75 MG/1; MG/1
100 CAPSULE ORAL 2 TIMES DAILY
Qty: 10 CAPSULE | Refills: 0 | Status: SHIPPED | OUTPATIENT
Start: 2024-05-20 | End: 2024-05-25

## 2024-05-20 NOTE — PROGRESS NOTES
Subjective:      Patient ID: Nikki Rodriguez is a 34 y.o. female.    Vitals:  vitals were not taken for this visit.     Chief Complaint: Dysuria      Visit Type: TELE AUDIOVISUAL    Present with the patient at the time of consultation: TELEMED PRESENT WITH PATIENT: None    Location: Home in [place]    Past Medical History:   Diagnosis Date    Anxiety 11/02/2022    Attention deficit hyperactivity disorder (ADHD), predominantly hyperactive type 11/02/2022    Bipolar affective 11/02/2022    Bipolar disorder     Herpes genitalis     Major depressive disorder 11/02/2022     Past Surgical History:   Procedure Laterality Date    BREAST SURGERY  2012    Lumpectomy Lt Breast     Review of patient's allergies indicates:   Allergen Reactions    Azithromycin Itching, Rash and Hives     Current Outpatient Medications on File Prior to Visit   Medication Sig Dispense Refill    NASRA 24 FE 1 mg-20 mcg (24)/75 mg (4) per tablet Take 1 tablet by mouth once daily. 30 tablet 3    propranoloL (INDERAL) 20 MG tablet Take 1 tablet (20 mg total) by mouth 3 (three) times daily. 90 tablet 2    valACYclovir (VALTREX) 1000 MG tablet Take 1 tablet (1,000 mg total) by mouth once daily. 30 tablet 11     No current facility-administered medications on file prior to visit.     Family History   Problem Relation Name Age of Onset    Vision loss Mother Deana         Glaucoma    Arthritis Mother Deana     Cancer Father Walter         Stomach    No Known Problems Sister      No Known Problems Brother      Cancer Maternal Aunt Terri         Breast triple neg    Cancer Paternal Aunt Zee Hurd and ovarian    Cancer Paternal Aunt Loni         Breast    Diabetes Maternal Grandmother Rosaline     Hypertension Maternal Grandmother Rosaline     COPD Maternal Grandmother Rosaline     Hearing loss Maternal Grandmother Rosaline     Heart disease Maternal Grandfather Kvng     Hypertension Maternal Grandfather Kvng     Cancer Paternal Grandmother Kinjal          Beast, lung       Medications Ordered                [id number] - [place] - [address]   [address] [place] [address]    Telephone: [phone]   Fax: [phone]   Hours: Not open 24 hours                         E-Prescribed (2 of 2)              nitrofurantoin, macrocrystal-monohydrate, (MACROBID) 100 MG capsule    Sig: Take 1 capsule (100 mg total) by mouth 2 (two) times daily. for 5 days       Start: 5/20/24     Quantity: 10 capsule Refills: 0                         phenazopyridine (PYRIDIUM) 200 MG tablet    Sig: Take 1 tablet (200 mg total) by mouth 3 (three) times daily as needed for Pain.       Start: 5/20/24     Quantity: 6 tablet Refills: 0                           [facility] Peq Odvv Intake    5/20/2024  7:20 AM CDT - Filed by Patient   What is your current physical address in the event of a medical emergency? [address] [place] [address] [address] [place] [place] [address]   Are you able to take your vital signs? No   Please attach any relevant images or files          Pt reports cloudy foul smelling urine, urgency, burning that began yesterday.  Today has small amount of blood only when wiping. Denies fever or chills. Last UTI in November. Not pregnant -- took pregnancy test last week.     Dysuria   This is a new problem. The current episode started acute onset. The quality of the pain is described as burning. There has been no fever. She is Sexually active. There is No history of pyelonephritis. Associated symptoms include hematuria and urgency. Pertinent negatives include no discharge, flank pain, frequency or vomiting. She has tried increased fluids for the symptoms. The treatment provided mild relief.       Constitution: Negative for fever.   Gastrointestinal:  Positive for abdominal pain. Negative for vomiting.   Genitourinary:  Positive for dysuria, urgency and hematuria. Negative for frequency and flank pain.        Objective:   The physical exam was conducted virtually.  Physical Exam    Constitutional: She is oriented to person, place, and time. No distress.   HENT:   Head: Normocephalic.   Nose: Nose normal.   Eyes: Conjunctivae are normal. No scleral icterus.   Pulmonary/Chest: Effort normal. No respiratory distress.   Abdominal: Soft. There is no abdominal tenderness (per pt self-palpation and report). There is no guarding, no left CVA tenderness and no right CVA tenderness (per pt self-palpation and report).   Musculoskeletal: Normal range of motion.         General: Normal range of motion.   Neurological: She is alert and oriented to person, place, and time.   Skin: Skin is not diaphoretic.   Psychiatric: Her behavior is normal. Judgment and thought content normal.       Assessment:     1. Acute cystitis with hematuria        Plan:       Acute cystitis with hematuria    Other orders  -     nitrofurantoin, macrocrystal-monohydrate, (MACROBID) 100 MG capsule; Take 1 capsule (100 mg total) by mouth 2 (two) times daily. for 5 days  Dispense: 10 capsule; Refill: 0  -     phenazopyridine (PYRIDIUM) 200 MG tablet; Take 1 tablet (200 mg total) by mouth 3 (three) times daily as needed for Pain.  Dispense: 6 tablet; Refill: 0      Rest. Drink plenty of fluids.    Pyridium (phenazopyridine) may turn your urine an orange/brown color -- do not be alarmed.    If no improvement after 2-3 days or if you start having fever, back/flank pain, blood in your urine, vomiting, decreased urination, etc, go to the nearest urgent care, as you may need to have your urine tested and a culture/sensitivity performed.    The best practice standard is for you to have a urine dipstick performed and a culture sent off prior to antibiotics being prescribed, as antibiotic overuse and resistance are big problems we face today.    Follow-up with your PCP in 3 days.     All questions were answered to the best of my abilities and all concerns were addressed at this time.     Follow up:   1. Please schedule a virtual follow up  visit as needed.  2. Please see an in-person provider if your symptoms are worsening or not improving in 2-3 days.  3. Please print a copy of this note and send it to your regular doctor or take it to your next visit so it may be included in your medical record.   4. You must understand that you've received Telehealth Urgent Care treatment only and that you may be released before all your medical problems are known or treated. You, the patient, will arrange for follow up care as instructed.  5. Follow up with your PCP or specialty clinic as directed. To schedule an appointment with the appropriate provider with [facility], please call [phone]. For pediatric referrals, please call [phone]  6. Contact customer support at [phone] for questions or concerns  7. For prescription questions or problems, call [phone] anytime for assistance.  8. For [facility] support, call [phone].

## 2024-07-22 RX ORDER — NORETHINDRONE ACETATE/ETHINYL ESTRADIOL AND FERROUS FUMARATE 1MG-20(24)
1 KIT ORAL DAILY
Qty: 30 TABLET | Refills: 3 | Status: SHIPPED | OUTPATIENT
Start: 2024-07-22

## 2024-08-21 DIAGNOSIS — A60.04 HERPES SIMPLEX VULVOVAGINITIS: ICD-10-CM

## 2024-08-21 RX ORDER — VALACYCLOVIR HYDROCHLORIDE 1 G/1
1000 TABLET, FILM COATED ORAL DAILY
Qty: 30 TABLET | Refills: 11 | Status: SHIPPED | OUTPATIENT
Start: 2024-08-21 | End: 2025-08-21

## 2024-09-18 ENCOUNTER — OFFICE VISIT (OUTPATIENT)
Dept: URGENT CARE | Facility: CLINIC | Age: 34
End: 2024-09-18
Payer: COMMERCIAL

## 2024-09-18 VITALS
BODY MASS INDEX: 21.35 KG/M2 | TEMPERATURE: 99 F | OXYGEN SATURATION: 99 % | DIASTOLIC BLOOD PRESSURE: 72 MMHG | HEIGHT: 62 IN | RESPIRATION RATE: 18 BRPM | HEART RATE: 96 BPM | WEIGHT: 116 LBS | SYSTOLIC BLOOD PRESSURE: 107 MMHG

## 2024-09-18 DIAGNOSIS — U07.1 COVID-19: Primary | ICD-10-CM

## 2024-09-18 DIAGNOSIS — R50.9 FEVER, UNSPECIFIED FEVER CAUSE: ICD-10-CM

## 2024-09-18 LAB
CTP QC/QA: YES
SARS-COV-2 AG RESP QL IA.RAPID: POSITIVE

## 2024-09-18 PROCEDURE — 99213 OFFICE O/P EST LOW 20 MIN: CPT | Mod: ,,, | Performed by: FAMILY MEDICINE

## 2024-09-18 PROCEDURE — 87811 SARS-COV-2 COVID19 W/OPTIC: CPT | Mod: QW,,, | Performed by: FAMILY MEDICINE

## 2024-09-18 NOTE — PATIENT INSTRUCTIONS
COVID-19 test positive, condition and course discussed.  Adequate hydration and rest. Monitor the symptoms closely  Recommendation is to follow a timeline quarantine measures per CDC and LDH  Tylenol as needed for fever, body aches and headache. Antihistamine of choice for congestion.   Robitussin-DM for cough and cold as needed and as directed.  Trial of vitamin-C and vitamin D3 for 10 days  Recommendation is to home quarantine until no fever (100.4 and above) for 24 hours and with improved symptoms   Discussed in detail on antiviral medication Paxlovid, defers today.  Wear a mask, cover your cough and sneeze. Clean any shared surfaces  Call or return to clinic for any questions. ER precautions with any acute change in symptoms. Follow up with primary MD  Work excuse 3 days

## 2024-09-18 NOTE — PROGRESS NOTES
"Subjective:      Patient ID: Nikki Rodriguez is a 34 y.o. female.    Vitals:  height is 5' 2" (1.575 m) and weight is 52.6 kg (116 lb). Her oral temperature is 99.1 °F (37.3 °C). Her blood pressure is 107/72 and her pulse is 96. Her respiration is 18 and oxygen saturation is 99%.     Chief Complaint: Fever     Patient is a 34 y.o. female who presents to urgent care with complaints of fever (TMAX 101), PND, body aches, cough  x 1 day.  Patient denies sore throat.      Fever       Constitution: Positive for fever.      [place]:  34-year-old female present to clinic with concerns of fever, body aches, coughing and congestion since yesterday.  T-max at home 101.  Works in the hospital setting.  No concerns of positive exposure to infections.  No sore throat or difficulty swallowing.  States had COVID 3 times in the past.  Vaccinated for COVID-19.    ROS:  Constitutional : _ fever , Body aches, Chills  Eyes : _No redness, drainage or pain  HENT_No sore throat, postnasal drainage  Respiratory_no wheezing, no shortness of breath  Cardiovascular_no chest pain  Gastrointestinal_ No vomiting, No diarrhea, No abdominal pain  Musculoskeletal_no joint pain, no joint swelling  Integumentary_no skin rash or abnormal lesion    Objective:     Physical Exam  General : Alert and Oriented, No apparent distress, afebrile, sounds stuffy and congested  Neck - supple  HENT : Oropharynx no redness or swelling. Tonsils not enlarged, bilateral TMs intact mild fluid no redness.   Respiratory : Bilateral equal breath sounds, nonlabored respirations  Cardiovascular : Rate, rhythm regular, normal volume pulse, no murmur  Gastrointestinal: Full abdomen, soft, nontender to palpate  Integumentary : Warm, Dry and no rash    Assessment:     1. COVID-19    2. Fever, unspecified fever cause      Plan:   COVID-19 test positive, condition and course discussed.  Adequate hydration and rest. Monitor the symptoms closely  Recommendation is to follow a timeline " quarantine measures per CDC and LDH  Tylenol as needed for fever, body aches and headache. Antihistamine of choice for congestion.   Robitussin-DM for cough and cold as needed and as directed.  Trial of vitamin-C and vitamin D3 for 10 days  Recommendation is to home quarantine until no fever (100.4 and above) for 24 hours and with improved symptoms   Discussed in detail on antiviral medication Paxlovid, defers today.  Wear a mask, cover your cough and sneeze. Clean any shared surfaces  Call or return to clinic for any questions. ER precautions with any acute change in symptoms. Follow up with primary MD  Work excuse 3 days    COVID-19    Fever, unspecified fever cause  -     SARS Coronavirus 2 Antigen, POCT Manual Read

## 2024-10-18 ENCOUNTER — LAB VISIT (OUTPATIENT)
Dept: LAB | Facility: HOSPITAL | Age: 34
End: 2024-10-18
Attending: STUDENT IN AN ORGANIZED HEALTH CARE EDUCATION/TRAINING PROGRAM
Payer: COMMERCIAL

## 2024-10-18 DIAGNOSIS — Z13.29 SCREENING FOR THYROID DISORDER: ICD-10-CM

## 2024-10-18 DIAGNOSIS — Z00.00 WELL ADULT HEALTH CHECK: ICD-10-CM

## 2024-10-18 DIAGNOSIS — Z13.220 ENCOUNTER FOR LIPID SCREENING FOR CARDIOVASCULAR DISEASE: ICD-10-CM

## 2024-10-18 DIAGNOSIS — Z13.6 ENCOUNTER FOR LIPID SCREENING FOR CARDIOVASCULAR DISEASE: ICD-10-CM

## 2024-10-18 DIAGNOSIS — Z13.1 ENCOUNTER FOR SCREENING FOR DIABETES MELLITUS: ICD-10-CM

## 2024-10-18 LAB
ALBUMIN SERPL-MCNC: 4 G/DL (ref 3.5–5)
ALBUMIN/GLOB SERPL: 1.5 RATIO (ref 1.1–2)
ALP SERPL-CCNC: 34 UNIT/L (ref 40–150)
ALT SERPL-CCNC: 14 UNIT/L (ref 0–55)
ANION GAP SERPL CALC-SCNC: 8 MEQ/L
AST SERPL-CCNC: 19 UNIT/L (ref 5–34)
BASOPHILS # BLD AUTO: 0.04 X10(3)/MCL
BASOPHILS NFR BLD AUTO: 1.1 %
BILIRUB SERPL-MCNC: 1.9 MG/DL
BUN SERPL-MCNC: 7.6 MG/DL (ref 7–18.7)
CALCIUM SERPL-MCNC: 9.1 MG/DL (ref 8.4–10.2)
CHLORIDE SERPL-SCNC: 108 MMOL/L (ref 98–107)
CHOLEST SERPL-MCNC: 162 MG/DL
CHOLEST/HDLC SERPL: 3 {RATIO} (ref 0–5)
CO2 SERPL-SCNC: 25 MMOL/L (ref 22–29)
CREAT SERPL-MCNC: 0.92 MG/DL (ref 0.55–1.02)
CREAT/UREA NIT SERPL: 8
EOSINOPHIL # BLD AUTO: 0.1 X10(3)/MCL (ref 0–0.9)
EOSINOPHIL NFR BLD AUTO: 2.6 %
ERYTHROCYTE [DISTWIDTH] IN BLOOD BY AUTOMATED COUNT: 11.9 % (ref 11.5–17)
EST. AVERAGE GLUCOSE BLD GHB EST-MCNC: 88.2 MG/DL
GFR SERPLBLD CREATININE-BSD FMLA CKD-EPI: >60 ML/MIN/1.73/M2
GLOBULIN SER-MCNC: 2.7 GM/DL (ref 2.4–3.5)
GLUCOSE SERPL-MCNC: 85 MG/DL (ref 74–100)
HBA1C MFR BLD: 4.7 %
HCT VFR BLD AUTO: 39.1 % (ref 37–47)
HDLC SERPL-MCNC: 63 MG/DL (ref 35–60)
HGB BLD-MCNC: 12.6 G/DL (ref 12–16)
IMM GRANULOCYTES # BLD AUTO: 0 X10(3)/MCL (ref 0–0.04)
IMM GRANULOCYTES NFR BLD AUTO: 0 %
LDLC SERPL CALC-MCNC: 83 MG/DL (ref 50–140)
LYMPHOCYTES # BLD AUTO: 1.45 X10(3)/MCL (ref 0.6–4.6)
LYMPHOCYTES NFR BLD AUTO: 38.3 %
MCH RBC QN AUTO: 29.5 PG (ref 27–31)
MCHC RBC AUTO-ENTMCNC: 32.2 G/DL (ref 33–36)
MCV RBC AUTO: 91.6 FL (ref 80–94)
MONOCYTES # BLD AUTO: 0.29 X10(3)/MCL (ref 0.1–1.3)
MONOCYTES NFR BLD AUTO: 7.7 %
NEUTROPHILS # BLD AUTO: 1.91 X10(3)/MCL (ref 2.1–9.2)
NEUTROPHILS NFR BLD AUTO: 50.3 %
NRBC BLD AUTO-RTO: 0 %
PAP RECOMMENDATION EXT: NORMAL
PAP SMEAR: NORMAL
PLATELET # BLD AUTO: 279 X10(3)/MCL (ref 130–400)
PMV BLD AUTO: 9.4 FL (ref 7.4–10.4)
POTASSIUM SERPL-SCNC: 4.2 MMOL/L (ref 3.5–5.1)
PROT SERPL-MCNC: 6.7 GM/DL (ref 6.4–8.3)
RBC # BLD AUTO: 4.27 X10(6)/MCL (ref 4.2–5.4)
SODIUM SERPL-SCNC: 141 MMOL/L (ref 136–145)
TRIGL SERPL-MCNC: 78 MG/DL (ref 37–140)
TSH SERPL-ACNC: 1.91 UIU/ML (ref 0.35–4.94)
VLDLC SERPL CALC-MCNC: 16 MG/DL
WBC # BLD AUTO: 3.79 X10(3)/MCL (ref 4.5–11.5)

## 2024-10-18 PROCEDURE — 83036 HEMOGLOBIN GLYCOSYLATED A1C: CPT

## 2024-10-18 PROCEDURE — 36415 COLL VENOUS BLD VENIPUNCTURE: CPT

## 2024-10-18 PROCEDURE — 84443 ASSAY THYROID STIM HORMONE: CPT

## 2024-10-18 PROCEDURE — 85025 COMPLETE CBC W/AUTO DIFF WBC: CPT

## 2024-10-18 PROCEDURE — 80053 COMPREHEN METABOLIC PANEL: CPT

## 2024-10-18 PROCEDURE — 80061 LIPID PANEL: CPT

## 2024-10-21 ENCOUNTER — TELEPHONE (OUTPATIENT)
Dept: FAMILY MEDICINE | Facility: CLINIC | Age: 34
End: 2024-10-21
Payer: COMMERCIAL

## 2024-10-21 DIAGNOSIS — R17 ELEVATED BILIRUBIN: Primary | ICD-10-CM

## 2024-10-21 NOTE — PROGRESS NOTES
Elevated bilirubin   Further blood work and hepatic ultrasound ordered to be completed  Please find out with the patient if the patient is experiencing any low-grade fevers, nausea, abdominal pain, yellowish discoloration of stool or urine, recently used any medications like corticosteroids or penicillin.       All the blood work within acceptable range and we will discuss in detail during the clinic visit  Please schedule annual wellness visit after 11/07/2024

## 2024-10-24 ENCOUNTER — LAB VISIT (OUTPATIENT)
Dept: LAB | Facility: HOSPITAL | Age: 34
End: 2024-10-24
Attending: STUDENT IN AN ORGANIZED HEALTH CARE EDUCATION/TRAINING PROGRAM
Payer: COMMERCIAL

## 2024-10-24 DIAGNOSIS — R17 ELEVATED BILIRUBIN: ICD-10-CM

## 2024-10-24 DIAGNOSIS — O26.619 RECURRENT JAUNDICE OF PREGNANCY: Primary | ICD-10-CM

## 2024-10-24 DIAGNOSIS — R17 RECURRENT JAUNDICE OF PREGNANCY: Primary | ICD-10-CM

## 2024-10-24 LAB
BILIRUB DIRECT SERPL-MCNC: 0.4 MG/DL (ref 0–?)
BILIRUB SERPL-MCNC: 1.4 MG/DL
BILIRUBIN DIRECT+TOT PNL SERPL-MCNC: 1 MG/DL (ref 0–0.8)
HEMATOLOGIST REVIEW: NORMAL
INR PPP: 1
PROTHROMBIN TIME: 13.4 SECONDS (ref 12.5–14.5)

## 2024-10-24 PROCEDURE — 80074 ACUTE HEPATITIS PANEL: CPT

## 2024-10-24 PROCEDURE — 85610 PROTHROMBIN TIME: CPT

## 2024-10-24 PROCEDURE — 87799 DETECT AGENT NOS DNA QUANT: CPT

## 2024-10-24 PROCEDURE — 82248 BILIRUBIN DIRECT: CPT

## 2024-10-24 PROCEDURE — 82247 BILIRUBIN TOTAL: CPT

## 2024-10-24 PROCEDURE — 86644 CMV ANTIBODY: CPT

## 2024-10-24 PROCEDURE — 36415 COLL VENOUS BLD VENIPUNCTURE: CPT

## 2024-10-25 LAB
EBV DNA SERPL NAA+PROBE-ACNC: NORMAL IU/ML
HAV IGM SERPL QL IA: NONREACTIVE
HBV CORE IGM SERPL QL IA: NONREACTIVE
HBV SURFACE AG SERPL QL IA: NONREACTIVE
HCV AB SERPL QL IA: NONREACTIVE

## 2024-10-26 LAB
CMV IGG SERPL QL IA: NEGATIVE
CMV IGM SERPL QL IA: NEGATIVE

## 2024-10-27 LAB — PATH REV: NORMAL

## 2024-10-30 ENCOUNTER — E-CONSULT (OUTPATIENT)
Dept: HEMATOLOGY/ONCOLOGY | Facility: CLINIC | Age: 34
End: 2024-10-30
Payer: COMMERCIAL

## 2024-10-30 DIAGNOSIS — E80.4 GILBERT'S SYNDROME: Primary | ICD-10-CM

## 2024-10-30 DIAGNOSIS — D72.819 LEUKOPENIA, UNSPECIFIED TYPE: ICD-10-CM

## 2024-10-30 DIAGNOSIS — E80.6 INDIRECT HYPERBILIRUBINEMIA: Primary | ICD-10-CM

## 2024-10-30 DIAGNOSIS — E80.6 INDIRECT HYPERBILIRUBINEMIA: ICD-10-CM

## 2024-11-05 ENCOUNTER — HOSPITAL ENCOUNTER (OUTPATIENT)
Dept: RADIOLOGY | Facility: HOSPITAL | Age: 34
Discharge: HOME | End: 2024-11-05
Attending: STUDENT IN AN ORGANIZED HEALTH CARE EDUCATION/TRAINING PROGRAM
Payer: COMMERCIAL

## 2024-11-05 DIAGNOSIS — R17 ELEVATED BILIRUBIN: ICD-10-CM

## 2024-11-05 PROCEDURE — 76705 ECHO EXAM OF ABDOMEN: CPT | Mod: TC

## 2024-11-06 ENCOUNTER — PATIENT MESSAGE (OUTPATIENT)
Dept: FAMILY MEDICINE | Facility: CLINIC | Age: 34
End: 2024-11-06
Payer: COMMERCIAL

## 2024-12-02 ENCOUNTER — OFFICE VISIT (OUTPATIENT)
Dept: FAMILY MEDICINE | Facility: CLINIC | Age: 34
End: 2024-12-02
Payer: COMMERCIAL

## 2024-12-02 ENCOUNTER — DOCUMENTATION ONLY (OUTPATIENT)
Dept: FAMILY MEDICINE | Facility: CLINIC | Age: 34
End: 2024-12-02

## 2024-12-02 VITALS
SYSTOLIC BLOOD PRESSURE: 122 MMHG | DIASTOLIC BLOOD PRESSURE: 60 MMHG | WEIGHT: 116 LBS | HEIGHT: 62 IN | TEMPERATURE: 98 F | OXYGEN SATURATION: 97 % | BODY MASS INDEX: 21.35 KG/M2 | HEART RATE: 65 BPM | RESPIRATION RATE: 18 BRPM

## 2024-12-02 DIAGNOSIS — Z00.00 WELLNESS EXAMINATION: ICD-10-CM

## 2024-12-02 DIAGNOSIS — E80.4 GILBERT SYNDROME: ICD-10-CM

## 2024-12-02 DIAGNOSIS — F32.5 MAJOR DEPRESSIVE DISORDER WITH SINGLE EPISODE, IN FULL REMISSION: Chronic | ICD-10-CM

## 2024-12-02 DIAGNOSIS — F41.9 ANXIETY: Chronic | ICD-10-CM

## 2024-12-02 DIAGNOSIS — F31.9 BIPOLAR AFFECTIVE DISORDER, REMISSION STATUS UNSPECIFIED: ICD-10-CM

## 2024-12-02 DIAGNOSIS — Z00.00 WELL ADULT HEALTH CHECK: ICD-10-CM

## 2024-12-02 DIAGNOSIS — F90.1 ATTENTION DEFICIT HYPERACTIVITY DISORDER (ADHD), PREDOMINANTLY HYPERACTIVE TYPE: Chronic | ICD-10-CM

## 2024-12-02 PROCEDURE — 3074F SYST BP LT 130 MM HG: CPT | Mod: CPTII,,, | Performed by: STUDENT IN AN ORGANIZED HEALTH CARE EDUCATION/TRAINING PROGRAM

## 2024-12-02 PROCEDURE — 3078F DIAST BP <80 MM HG: CPT | Mod: CPTII,,, | Performed by: STUDENT IN AN ORGANIZED HEALTH CARE EDUCATION/TRAINING PROGRAM

## 2024-12-02 PROCEDURE — 3044F HG A1C LEVEL LT 7.0%: CPT | Mod: CPTII,,, | Performed by: STUDENT IN AN ORGANIZED HEALTH CARE EDUCATION/TRAINING PROGRAM

## 2024-12-02 PROCEDURE — 1159F MED LIST DOCD IN RCRD: CPT | Mod: CPTII,,, | Performed by: STUDENT IN AN ORGANIZED HEALTH CARE EDUCATION/TRAINING PROGRAM

## 2024-12-02 PROCEDURE — 1160F RVW MEDS BY RX/DR IN RCRD: CPT | Mod: CPTII,,, | Performed by: STUDENT IN AN ORGANIZED HEALTH CARE EDUCATION/TRAINING PROGRAM

## 2024-12-02 PROCEDURE — 3008F BODY MASS INDEX DOCD: CPT | Mod: CPTII,,, | Performed by: STUDENT IN AN ORGANIZED HEALTH CARE EDUCATION/TRAINING PROGRAM

## 2024-12-02 PROCEDURE — 99395 PREV VISIT EST AGE 18-39: CPT | Mod: ,,, | Performed by: STUDENT IN AN ORGANIZED HEALTH CARE EDUCATION/TRAINING PROGRAM

## 2024-12-02 NOTE — PROGRESS NOTES
Patient ID: [id number]     Chief Complaint: Annual Exam        HPI:      Disclaimer:  This note is prepared using voice recognition software and as such is likely to have errors despite attempts at proofreading. Please contact me for questions.    Nikki Rodriguez is a 34 y.o. female here today for an annual wellness visit.    Patient has following issues to discuss today    Acute Issues-  Overall doing good.  Denies of any new concerns.  Abnormal bilirubin level.  Workup demonstrated Gilbert's syndrome.  Currently asymptomatic.    Bipolar  Asymptomatic, not on medication    Gilbert syndrome   Asymptomatic    Family history of breast cancer   Mammogram within normal    Herpes genitalis   On Valtrex, currently asymptomatic  Chronic Issues-  -------------------------------------    Anxiety    Attention deficit hyperactivity disorder (ADHD), predominantly hyperactive type    Bipolar affective    Bipolar disorder    Herpes genitalis    Major depressive disorder        Past Surgical History:   Procedure Laterality Date    BREAST SURGERY  2012    Lumpectomy Lt Breast       Review of patient's allergies indicates:   Allergen Reactions    Azithromycin Itching, Rash and Hives       Current Outpatient Medications   Medication Instructions    NASRA 24 FE 1 mg-20 mcg (24)/75 mg (4) per tablet 1 tablet, Oral, Daily    propranoloL (INDERAL) 20 mg, Oral, 3 times daily    valACYclovir (VALTREX) 1,000 mg, Oral, Daily       Social History     Socioeconomic History    Marital status:    Tobacco Use    Smoking status: Never     Passive exposure: Never    Smokeless tobacco: Never   Substance and Sexual Activity    Alcohol use: Yes     Alcohol/week: 2.0 standard drinks of alcohol     Types: 2 Cans of beer per week     Comment: occasional    Drug use: Never    Sexual activity: Yes     Partners: Male     Birth control/protection: OCP     Comment: Copper/paraguard        Family History   Problem Relation Name Age of Onset    Vision  loss Mother Deana         Glaucoma    Arthritis Mother Deana     Cancer Father Walter         Stomach    No Known Problems Sister      No Known Problems Brother      Cancer Maternal Aunt Terri         Breast triple neg    Cancer Paternal Aunt Zee Hurd and ovarian    Cancer Paternal Aunt Loni         Breast    Diabetes Maternal Grandmother Rosaline     Hypertension Maternal Grandmother Rosaline     COPD Maternal Grandmother Rosaline     Hearing loss Maternal Grandmother Rosaline     Heart disease Maternal Grandfather Kvng     Hypertension Maternal Grandfather Kvng     Cancer Paternal Grandmother Windalyn         Beast, lung        Patient Care Team:  Kasia Rodríguez MD as PCP - General (Family Medicine)       Subjective:     ROS    See HPI for details    Constitutional: Denies Change in appetite. Denies Chills. Denies Fever. Denies Night sweats.  Respiratory: Denies Cough. Denies Shortness of breath. Denies Shortness of breath with exertion. Denies Wheezing.  Cardiovascular: DeniesChest pain at rest. Denies Chest pain with exertion. Denies Irregular heartbeat. Denies Palpitations. Denies Edema.  Gastrointestinal: Denies Abdominal pain. DeniesDiarrhea. Denies Nausea. Denies Vomiting. Denies Hematemesis or Hematochezia.  Genitourinary: Denies Dysuria. Denies Urinary frequency. Denies Urinary urgency. Denies Blood in urine.  Endocrine: Denies Cold intolerance. Denies Excessive thirst. Denies Heat intolerance. Denies Weight loss. Denies Weight gain.  Musculoskeletal: Denies Painful joints. Denies Weakness.  Integumentary: Denies Rash. Denies Itching. Denies Dry skin.  Neurologic: Denies Dizziness. Denies Fainting. Denies Headache.  Psychiatric: Denies Depression. Denies Anxiety. Denies Suicidal/Homicidal ideations.    All Other ROS: Negative except as stated in HPI.       Objective:     Visit Vitals  /60 (BP Location: Left arm, Patient Position: Sitting)   Pulse 65   Temp 98 °F (36.7 °C) (Oral)   Resp  "18   Ht 5' 2" (1.575 m)   Wt 52.6 kg (116 lb)   SpO2 97%   BMI 21.22 kg/m²       Physical Exam    General: Alert and oriented, No acute distress.  Neck/Thyroid: Supple, Non-tender  Respiratory: Clear to auscultation bilaterally; No wheezes  Cardiovascular: Regular rate and rhythm  Gastrointestinal: Soft, Non-tender,No palpable organomegaly.  Musculoskeletal: Normal range of motion.  Neurologic: No focal deficits  Psychiatric: Normal interaction, Coherent speech, Euthymic mood, Appropriate affect       Assessment:       ICD-10-CM ICD-9-CM   1. Wellness examination  Z00.00 V70.0   2. Gilbert syndrome  E80.4 277.4   3. Bipolar affective disorder, remission status unspecified  F31.9 296.80   4. Major depressive disorder with single episode, in full remission  F32.5 296.26   5. Attention deficit hyperactivity disorder (ADHD), predominantly hyperactive type  F90.1 314.01   6. Anxiety  F41.9 300.00   7. Well adult health check  Z00.00 V70.0        Plan:There is no height or weight on file to calculate BMI.  Goal BMI <30.  Exercise 5 times a week for 30 minutes per day.  Avoid soda, simple sugars, excessive rice, potatoes or bread. Limit fast foods and fried foods.  Choose complex carbs in moderation (example: green vegetables, beans, oatmeal). Eat plenty of fresh fruits and vegetables with lean meats daily.  Do not skip meals. Eat a balanced portion size.  Avoid fad diets. Consider permanent healthy life style changes.          Health Maintenance Topics with due status: Not Due       Topic Last Completion Date    TETANUS VACCINE 09/06/2016    Cervical Cancer Screening 06/06/2023    RSV Vaccine (Age 60+ and Pregnant patients) Not Due          Vaccinations -   Immunization History   Administered Date(s) Administered    COVID-19, MRNA, LN-S, PF (Pfizer) (Purple Cap) 08/02/2021, 08/23/2021    Influenza - Quadrivalent - PF *Preferred* (6 months and older) 10/05/2016, 10/15/2021, 11/02/2022, 11/22/2023    Influenza - Trivalent - " Fluarix, Flulaval, Fluzone, Afluria - PF 10/17/2017, 10/15/2020    Influenza - Trivalent - Flucelvax - PF 12/01/2024    Tdap 09/06/2016        1. Wellness examination   Cervical Cancer Screening - Pap Smear Dr. Coyle as [facility]   Breast Cancer Screening - After 40  Colon Cancer Screening -  after 45  Osteoporosis Screening -  DEXA   Eye Exam - Recommend annually.  Dental Exam - Recommend biannual exams.     Diet discussed (healthy food choices, reduce portions and overall calorie intake)  Exercise 30-45 minutes 5x per week  Avoid excessive alcohol and tobacco if taking  Immunizations dicussed  Monthly breast exam recommended  Preventative exams discussed.      2. Gilbert syndrome  Remain hydrated   ER precautions provided   Continue to monitor    3. Bipolar affective disorder, remission status unspecified  4. Depression in remission   5.ADHD   6. anxiety  Well-controlled without medication  Continue to monitor    4. Well adult health check  -     CBC Auto Differential; Future; Expected date: 12/02/2025  -     Comprehensive Metabolic Panel; Future; Expected date: 12/02/2025  -     Lipid Panel; Future; Expected date: 12/02/2025  -     TSH; Future; Expected date: 12/02/2025  -     Hemoglobin A1C; Future; Expected date: 12/02/2025  -     Urinalysis; Future; Expected date: 12/02/2025  -     Microalbumin/Creatinine Ratio, Urine; Future; Expected date: 12/02/2025  -     T4, Free; Future; Expected date: 12/02/2025  -     Uric Acid; Future; Expected date: 12/02/2025  -     Vitamin B12; Future; Expected date: 12/02/2025  -     Folate; Future; Expected date: 12/02/2025        Medication List with Changes/Refills   Current Medications    NASRA 24 FE 1 MG-20 MCG (24)/75 MG (4) PER TABLET    Take 1 tablet by mouth once daily.       Start Date: 7/22/2024 End Date: --    PROPRANOLOL (INDERAL) 20 MG TABLET    Take 1 tablet (20 mg total) by mouth 3 (three) times daily.       Start Date: 1/12/2024 End Date: 4/11/2024     VALACYCLOVIR (VALTREX) 1000 MG TABLET    Take 1 tablet (1,000 mg total) by mouth once daily.       Start Date: 8/21/2024 End Date: 8/21/2025          The patient's Health Maintenance was reviewed and the following appears to be due at this time:   Health Maintenance Due   Topic Date Due    COVID-19 Vaccine (3 - 2024-25 season) 09/01/2024         Follow up in about 1 year (around 12/2/2025) for Annual Wellness.   In addition to their scheduled follow up, the patient has also been instructed to follow up on as needed basis.     No future appointments.

## 2024-12-05 ENCOUNTER — OFFICE VISIT (OUTPATIENT)
Dept: URGENT CARE | Facility: CLINIC | Age: 34
End: 2024-12-05
Payer: COMMERCIAL

## 2024-12-05 VITALS
RESPIRATION RATE: 19 BRPM | TEMPERATURE: 98 F | WEIGHT: 116 LBS | HEIGHT: 62 IN | BODY MASS INDEX: 21.35 KG/M2 | OXYGEN SATURATION: 98 % | HEART RATE: 89 BPM | DIASTOLIC BLOOD PRESSURE: 67 MMHG | SYSTOLIC BLOOD PRESSURE: 105 MMHG

## 2024-12-05 DIAGNOSIS — J03.90 TONSILLITIS: Primary | ICD-10-CM

## 2024-12-05 RX ORDER — DEXAMETHASONE SODIUM PHOSPHATE 10 MG/ML
6 INJECTION INTRAMUSCULAR; INTRAVENOUS
Status: COMPLETED | OUTPATIENT
Start: 2024-12-05 | End: 2024-12-05

## 2024-12-05 RX ORDER — AMOXICILLIN 500 MG/1
500 CAPSULE ORAL EVERY 12 HOURS
Qty: 20 CAPSULE | Refills: 0 | Status: SHIPPED | OUTPATIENT
Start: 2024-12-05 | End: 2024-12-15

## 2024-12-05 RX ADMIN — DEXAMETHASONE SODIUM PHOSPHATE 6 MG: 10 INJECTION INTRAMUSCULAR; INTRAVENOUS at 09:12

## 2024-12-05 NOTE — PATIENT INSTRUCTIONS
Medications sent to pharmacy  Take all of your antibiotic even if you feel better  Start the oral steroids tomorrow afternoon for inflammation relief.   You can return to work after 24 hours of antibiotics   Monitor for fever  Ibuprofen as needed for pain or fever every 6-8 hours   Warm saltwater gargles  Do not share any food cups drinks or utensils with anybody.   Change your toothbrush after 2 days of antibiotics  Hydrate; cool soft foods   Return to clinic or seek medical attention immediately if his symptoms persist or worsen as discussed including drooling, difficulty swallowing, voice changes, or wheezing ect. Strict ER precautions noted

## 2024-12-05 NOTE — PROGRESS NOTES
"Subjective:      Patient ID: Nikki Rodriguez is a 34 y.o. female.    Vitals:  height is 5' 2" (1.575 m) and weight is 52.6 kg (116 lb). Her temperature is 98.1 °F (36.7 °C). Her blood pressure is 105/67 and her pulse is 89. Her respiration is 19 and oxygen saturation is 98%.     Chief Complaint: Sore Throat (Tonsillitis - Entered by patient)     Patient is a 34 y.o. female who presents to urgent care with complaints of sore throat/swollen tonsils/fever tmax x4 days. Alleviating factors include ibuprofen  with mild amount of relief. Patient denies n/v, sob, difficulty swallowing, nasal congestion or cough.       Sore Throat   Pertinent negatives include no congestion, coughing, shortness of breath or trouble swallowing.     Constitution: Positive for fever. Negative for chills.   HENT:  Positive for sore throat. Negative for congestion, trouble swallowing and voice change.    Eyes: Negative.    Respiratory:  Negative for cough, shortness of breath and wheezing.       Objective:     Physical Exam   Constitutional: She is oriented to person, place, and time. She appears well-developed. She is cooperative.  Non-toxic appearance. She does not appear ill. No distress.   HENT:   Head: Normocephalic and atraumatic.   Ears:   Right Ear: Hearing, tympanic membrane and external ear normal.   Left Ear: Hearing, tympanic membrane and external ear normal.   Nose: No congestion.   Mouth/Throat: Uvula is midline and mucous membranes are normal. Mucous membranes are moist. No trismus in the jaw. No uvula swelling. Posterior oropharyngeal erythema (no palatal edema noted, uvula midline, no trismus or voice changes) present. No oropharyngeal exudate or posterior oropharyngeal edema. Tonsils are 2+ on the right. Tonsils are 1+ on the left.   Eyes: Conjunctivae and lids are normal.   Neck: Trachea normal and phonation normal. Neck supple. No edema present. No erythema present. No neck rigidity present.   Cardiovascular: Normal rate. "   Pulmonary/Chest: Effort normal and breath sounds normal. No stridor. No respiratory distress. She has no decreased breath sounds. She has no wheezes. She has no rhonchi. She has no rales.   Abdominal: Normal appearance.   Neurological: She is alert and oriented to person, place, and time. She exhibits normal muscle tone.   Skin: Skin is warm, dry, intact, not diaphoretic and no rash. Capillary refill takes less than 2 seconds.   Psychiatric: Her speech is normal and behavior is normal. Mood normal.   Nursing note and vitals reviewed.    Patient declined POCT strep testing in clinic   Right tonsil larger than left 2+ vs 1+, patient reports chronic inflammation to right tonsil and that is always slightly larger     Assessment:     1. Tonsillitis        Plan:       Tonsillitis    Other orders  -     dexAMETHasone injection 6 mg  -     amoxicillin (AMOXIL) 500 MG capsule; Take 1 capsule (500 mg total) by mouth every 12 (twelve) hours. for 10 days  Dispense: 20 capsule; Refill: 0      Medications sent to pharmacy  Take all of your antibiotic even if you feel better  Start the oral steroids tomorrow afternoon for inflammation relief.   You can return to work after 24 hours of antibiotics   Monitor for fever  Ibuprofen as needed for pain or fever every 6-8 hours   Warm saltwater gargles  Do not share any food cups drinks or utensils with anybody.   Change your toothbrush after 2 days of antibiotics  Hydrate; cool soft foods   Return to clinic or seek medical attention immediately if his symptoms persist or worsen as discussed including drooling, difficulty swallowing, voice changes, or wheezing ect

## 2024-12-10 ENCOUNTER — PATIENT MESSAGE (OUTPATIENT)
Dept: FAMILY MEDICINE | Facility: CLINIC | Age: 34
End: 2024-12-10
Payer: COMMERCIAL

## 2025-02-19 ENCOUNTER — E-VISIT (OUTPATIENT)
Dept: FAMILY MEDICINE | Facility: CLINIC | Age: 35
End: 2025-02-19
Payer: COMMERCIAL

## 2025-02-19 DIAGNOSIS — L75.0 BROMHIDROSIS: ICD-10-CM

## 2025-02-21 RX ORDER — GLYCOPYRROLATE 1 MG/1
1 TABLET ORAL 3 TIMES DAILY
Qty: 90 TABLET | Refills: 0 | Status: SHIPPED | OUTPATIENT
Start: 2025-02-21 | End: 2025-03-23

## 2025-02-21 NOTE — PROGRESS NOTES
Patient ID: Nikki Rodriguez is a 34 y.o. female.    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 Malodor typically results from bacterial degradation of fatty acids secreted in apocrine sweat.    Treatment  Generalized bromhidrosis - Identification and removal of the underlying cause of bromhidrosis     Localized bromhidrosis - The treatment of localized bromhidrosis focuses on improving hygiene and reducing bacteria and sweating in the affected area. For patients with localized bromhidrosis, initial management includes skin hygiene, absorbent clothing, and topical antiperspirants and deodorants.    Failure of initial treatment -- Treatment options for patients with localized bromhidrosis that does not respond sufficiently to hygiene, treatment of contributor skin diseases, and topical antiperspirants include other sweat-reduction therapies and antimicrobial therapy.    I will send you glycopyrrolate for hyperhidrosis 1-2 mg p.o. b.i.d. as needed.  Let me know if that works for you    Lastly we could send the referal to derm if you desire.    [facility] P[facility] Evisit [facility]-Common Problems    2/19/2025 11:09 PM CST - Filed by Patient   What do you need help with? Other Concern   Do you agree to participate in an E-Visit? Yes   If you have any of the following symptoms, please present to your local emergency room or call  911:  I acknowledge   Do you have any of the following pregnancy-related conditions? None   What is the main issue you would like addressed today? Abnormal body odor   Please describe your symptoms Maple syrup smell   Where is your problem located? [place] only   How severe are your symptoms? Moderate   Have you had these symptoms before? Yes   How long have you been having these symptoms? For more than a month   Please list any medications or treatments you have used for your condition and indicate if it was effective or not. N/a   What makes this feel better? N/a   What makes this feel worse? N/a   Are these symptoms related to a condition that you currently have? No   Please describe any probable cause for these symptoms Unknown   Provide any additional information you feel is important. Its not all tbe time but its happening more frequently.   Please attach any relevant images or files    Are you able to take your vital signs? Yes   Systolic Blood Pressure: 118   Diastolic Blood Pressure: 65   Weight: 116   Height: 62   Pulse: 72   Temperature: 98.2   Respiration rate:    Pulse Oxygen: 98         Encounter Diagnosis   Name Primary?    Bromhidrosis         No orders of the defined types were placed in this encounter.     Medications Ordered This Encounter   Medications    glycopyrrolate (ROBINUL) 1 mg Tab     Sig: Take 1 tablet (1 mg total) by mouth 3 (three) times daily.     Dispense:  90 tablet     Refill:  0        Follow up in about 4 weeks (around 3/19/2025) for Virtual Visit Bromhidrosis.      E-Visit Time Tracking:    Day 1 Time (in minutes): 12    Total Time (in minutes): 12

## 2025-03-17 ENCOUNTER — TELEPHONE (OUTPATIENT)
Dept: FAMILY MEDICINE | Facility: CLINIC | Age: 35
End: 2025-03-17
Payer: COMMERCIAL

## 2025-03-21 ENCOUNTER — OFFICE VISIT (OUTPATIENT)
Dept: FAMILY MEDICINE | Facility: CLINIC | Age: 35
End: 2025-03-21
Payer: COMMERCIAL

## 2025-03-21 VITALS — HEIGHT: 62 IN | BODY MASS INDEX: 21.53 KG/M2 | WEIGHT: 117 LBS

## 2025-03-21 DIAGNOSIS — L75.0 BROMHIDROSIS: ICD-10-CM

## 2025-03-21 DIAGNOSIS — F31.9 BIPOLAR AFFECTIVE DISORDER, REMISSION STATUS UNSPECIFIED: ICD-10-CM

## 2025-03-21 NOTE — PROGRESS NOTES
Patient ID: [id number]     Chief Complaint: No chief complaint on file.      HPI:     This is a telemedicine note. Patient was treated using telemedicine, real time audio and video, according to [facility] protocols. I, Kasia Rodríguez MD, conducted the visit from the [facility]. The patient participated in the visit at a non-[facility] location selected by the patient, identified below. I am licensed in the state where the patient stated they are located. The patient stated that they understood and accepted the privacy and security risks to their information at their location. This visit is not recorded.    Patient was located at the patient's home.       Nikki Rodriguez is a 34 y.o. female here today for a telemedicine visit.     History of Present Illness             Axillary area smelling  Patient reports that she has been a bad smell in her axillary area does not have to be related with shower.  It can happen immediately after the shower.  Relates it to food but not sure which food.  Denies specificity about Aspragus.  Was prescribed Haider all but she did not take it because she does not have excessive sweating.  Had used uterine in past but did not help her out.     Bipolar is well-controlled without medications since 2022   She has her coping techniques  Denies of any depression or anxiety  -------------------------------------    Anxiety    Attention deficit hyperactivity disorder (ADHD), predominantly hyperactive type    Bipolar affective    Bipolar disorder    Herpes genitalis    Major depressive disorder        Past Surgical History:   Procedure Laterality Date    BREAST SURGERY  2012    Lumpectomy Lt Breast       Review of patient's allergies indicates:   Allergen Reactions    Azithromycin Itching, Rash and Hives       Current Outpatient Medications   Medication Instructions    glycopyrrolate (ROBINUL) 1 mg, Oral, 3 times daily    NASRA 24 FE 1 mg-20 mcg (24)/75 mg (4) per tablet 1 tablet, Oral,  Daily    propranoloL (INDERAL) 20 mg, Oral, 3 times daily    valACYclovir (VALTREX) 1,000 mg, Oral, Daily       Social History[1]     Family History   Problem Relation Name Age of Onset    Vision loss Mother Deana         Glaucoma    Arthritis Mother Deana     Cancer Father Walter         Stomach    No Known Problems Sister      No Known Problems Brother      Cancer Maternal Aunt Terri         Breast triple neg    Cancer Paternal Aunt Zee Hurd and ovarian    Cancer Paternal Aunt Loni         Breast    Diabetes Maternal Grandmother Rosaline     Hypertension Maternal Grandmother Rosaline     COPD Maternal Grandmother Rosaline     Hearing loss Maternal Grandmother Rosaline     Heart disease Maternal Grandfather Kvng     Hypertension Maternal Grandfather Kvng     Cancer Paternal Grandmother Windalyn         Beast, lung        Patient Care Team:  Kasia Rodríguez MD as PCP - General (Family Medicine)  Nico Coyle MD as Consulting Physician (Obstetrics and Gynecology)      Subjective:       ROS    See HPI for details    Constitutional: Denies Change in appetite. Denies Chills. Denies Fever. Denies Night sweats.  Eye: Denies Blurred vision. Denies Discharge. Denies Eye pain.  ENT: Denies Decreased hearing. Denies Sore throat. Denies Swollen glands.  Respiratory: Denies Cough. Denies Shortness of breath. Denies Shortness of breath with exertion. Denies Wheezing.  Cardiovascular: DeniesChest pain at rest. Denies Chest pain with exertion. Denies Irregular heartbeat. Denies Palpitations. Denies Edema.  Gastrointestinal: Denies Abdominal pain. DeniesDiarrhea. Denies Nausea. Denies Vomiting. Denies Hematemesis or Hematochezia.  Genitourinary: Denies Dysuria. Denies Urinary frequency. Denies Urinary urgency. Denies Blood in urine.  Endocrine: Denies Cold intolerance. Denies Excessive thirst. Denies Heat intolerance. Denies Weight loss. Denies Weight gain.  Musculoskeletal: Denies Painful joints. Denies  Weakness.  Integumentary: Denies Rash. Denies Itching. Denies Dry skin.  Neurologic: Denies Dizziness. Denies Fainting. Denies Headache.  Psychiatric: Denies Depression. Denies Anxiety. Denies Suicidal/Homicidal ideations.    All Other ROS: Negative except as stated in HPI.       Objective:     There were no vitals taken for this visit.    Physical Exam    Physical Exam: LIMITED DUE TO TELEMEDICINE RESTRICTIONS.  General: Alert and oriented, No acute distress.  Head: Normocephalic, Atraumatic.  Eye: Sclera non-icteric.  Neck/Thyroid:  Full range of motion.  Respiratory: Non-labored respirations, Symmetrical chest wall expansion.  Musculoskeletal: Normal range of motion.  Integumentary:  No visible suspicious lesions or rashes. No diaphoresis.   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 Bipolar affective disorder, remission status unspecified  F31.9 296.80        Plan:     Assessment & Plan                   1. Bromhidrosis  -     E-Consult to Dermatology  Waiting on consults recommendation    2. Bipolar affective disorder, remission status unspecified  Well-controlled without medications   Continue to monitor   Continue yoga         Medication List with Changes/Refills   Current Medications    GLYCOPYRROLATE (ROBINUL) 1 MG TAB    Take 1 tablet (1 mg total) by mouth 3 (three) times daily.       Start Date: 2/21/2025 End Date: 3/23/2025    NASRA 24 FE 1 MG-20 MCG (24)/75 MG (4) PER TABLET    Take 1 tablet by mouth once daily.       Start Date: 7/22/2024 End Date: --    PROPRANOLOL (INDERAL) 20 MG TABLET    Take 1 tablet (20 mg total) by mouth 3 (three) times daily.       Start Date: 1/12/2024 End Date: 12/2/2024    VALACYCLOVIR (VALTREX) 1000 MG TABLET    Take 1 tablet (1,000 mg total) by mouth once daily.       Start Date: 8/21/2024 End Date: 8/21/2025           Follow up for Annual Wellness. In addition to their scheduled follow up, the patient has also been instructed to follow up on as needed basis.       Future Appointments   Date Time Provider Department Center   12/4/2025  9:30 AM Kasia Rodríguez MD [facility]     This note was generated with the assistance of ambient listening technology. Verbal consent was obtained by the patient and accompanying visitor(s) for the recording of patient appointment to facilitate this note. I attest to having reviewed and edited the generated note for accuracy, though some syntax or spelling errors may persist. Please contact the author of this note for any clarification.    Answers submitted by the patient for this visit:  Review of Systems Questionnaire (Submitted on 3/14/2025)  activity change: No  unexpected weight change: No  rhinorrhea: No  trouble swallowing: No  visual disturbance: No  chest tightness: No  polyuria: No  difficulty urinating: No  menstrual problem: No  joint swelling: No  arthralgias: No  confusion: No  dysphoric mood: Yes         [1]   Social History  Socioeconomic History    Marital status:    Tobacco Use    Smoking status: Never     Passive exposure: Never    Smokeless tobacco: Never   Substance and Sexual Activity    Alcohol use: Yes     Alcohol/week: 2.0 standard drinks of alcohol     Types: 2 Cans of beer per week     Comment: occasional    Drug use: Never    Sexual activity: Yes     Partners: Male     Birth control/protection: OCP     Comment: Copper/paraguard     Social Drivers of Health     Financial Resource Strain: Low Risk  (3/14/2025)    Overall Financial Resource Strain (CARDIA)     Difficulty of Paying Living Expenses: Not very hard   Food Insecurity: No Food Insecurity (3/14/2025)    Hunger Vital Sign     Worried About Running Out of Food in the Last Year: Never true     Ran Out of Food in the Last Year: Never true   Transportation Needs: No Transportation Needs  (3/14/2025)    PRAPARE - Transportation     Lack of Transportation (Medical): No     Lack of Transportation (Non-Medical): No   Physical Activity: Sufficiently Active (3/14/2025)    Exercise Vital Sign     Days of Exercise per Week: 3 days     Minutes of Exercise per Session: 70 min   Stress: Stress Concern Present (3/14/2025)    [employer] - Occupational Stress Questionnaire     Feeling of Stress : To some extent   Housing Stability: Low Risk  (3/14/2025)    Housing Stability Vital Sign     Unable to Pay for Housing in the Last Year: No     Number of Times Moved in the Last Year: 0     Homeless in the Last Year: No

## 2025-03-29 ENCOUNTER — E-CONSULT (OUTPATIENT)
Dept: DERMATOLOGY | Facility: CLINIC | Age: 35
End: 2025-03-29
Payer: COMMERCIAL

## 2025-03-29 DIAGNOSIS — L75.0 BROMHIDROSIS: Primary | ICD-10-CM

## 2025-03-29 PROCEDURE — 99451 NTRPROF PH1/NTRNET/EHR 5/>: CPT | Mod: ,,, | Performed by: STUDENT IN AN ORGANIZED HEALTH CARE EDUCATION/TRAINING PROGRAM

## 2025-03-30 NOTE — CONSULTS
[facility]  Response for E-Consult     Patient Name: Nikki Rodriguez  MRN: [id number]  Primary Care Provider: Kasia Rodríguez MD   Requesting Provider: Kasia Rodríguez MD  E-Consult to Dermatology  Consult performed by: Izabela Hernandez MD  Consult ordered by: Kasia Rodríguez MD  Reason for consult: Body odor  Assessment/Recommendations: Thank you for this consult. 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 I would start with antiperspirant instead of deodorant, and start hibiclens in axilla with every shower. Please reconsult if the condition worsens or fails to improve             Recommendation: as above     Additional future steps to consider: Reconsult if the condition worsens or fails to improve       Total time of Consultation: 10 minute    I did not speak to the requesting provider verbally about this.     *This eConsult is based on the clinical data available to me and is furnished without benefit of a physical examination. The eConsult will need to be interpreted in light of any clinical issues or changes in patient status not available to me at the time of filing this eConsults. Significant changes in patient condition or level of acuity should result in immediate formal consultation and reevaluation. Please alert me if you have further questions.    Thank you for this eConsult referral.     Izabela Hernandez MD  [facility]

## 2025-03-31 DIAGNOSIS — L75.0 BROMHIDROSIS: Primary | ICD-10-CM

## 2025-03-31 RX ORDER — CHLORHEXIDINE GLUCONATE 40 MG/ML
SOLUTION TOPICAL DAILY PRN
Qty: 946 ML | Refills: 0 | Status: SHIPPED | OUTPATIENT
Start: 2025-03-31 | End: 2025-04-30

## 2025-04-01 ENCOUNTER — PATIENT MESSAGE (OUTPATIENT)
Dept: FAMILY MEDICINE | Facility: CLINIC | Age: 35
End: 2025-04-01
Payer: COMMERCIAL